# Patient Record
Sex: MALE | Race: WHITE | Employment: OTHER | URBAN - METROPOLITAN AREA
[De-identification: names, ages, dates, MRNs, and addresses within clinical notes are randomized per-mention and may not be internally consistent; named-entity substitution may affect disease eponyms.]

---

## 2020-10-27 ENCOUNTER — OFFICE VISIT (OUTPATIENT)
Dept: INTERNAL MEDICINE CLINIC | Facility: CLINIC | Age: 40
End: 2020-10-27
Payer: COMMERCIAL

## 2020-10-27 VITALS
DIASTOLIC BLOOD PRESSURE: 73 MMHG | SYSTOLIC BLOOD PRESSURE: 131 MMHG | HEIGHT: 77 IN | BODY MASS INDEX: 34 KG/M2 | WEIGHT: 288 LBS | HEART RATE: 85 BPM | RESPIRATION RATE: 21 BRPM

## 2020-10-27 DIAGNOSIS — F17.210 CIGARETTE SMOKER: ICD-10-CM

## 2020-10-27 DIAGNOSIS — F90.0 ATTENTION DEFICIT HYPERACTIVITY DISORDER (ADHD), PREDOMINANTLY INATTENTIVE TYPE: ICD-10-CM

## 2020-10-27 DIAGNOSIS — E83.118 OTHER HEMOCHROMATOSIS: Primary | ICD-10-CM

## 2020-10-27 PROCEDURE — 99406 BEHAV CHNG SMOKING 3-10 MIN: CPT | Performed by: INTERNAL MEDICINE

## 2020-10-27 PROCEDURE — 3075F SYST BP GE 130 - 139MM HG: CPT | Performed by: INTERNAL MEDICINE

## 2020-10-27 PROCEDURE — 3008F BODY MASS INDEX DOCD: CPT | Performed by: INTERNAL MEDICINE

## 2020-10-27 PROCEDURE — 3078F DIAST BP <80 MM HG: CPT | Performed by: INTERNAL MEDICINE

## 2020-10-27 PROCEDURE — 99204 OFFICE O/P NEW MOD 45 MIN: CPT | Performed by: INTERNAL MEDICINE

## 2020-10-27 RX ORDER — DEXTROAMPHETAMINE SACCHARATE, AMPHETAMINE ASPARTATE, DEXTROAMPHETAMINE SULFATE AND AMPHETAMINE SULFATE 7.5; 7.5; 7.5; 7.5 MG/1; MG/1; MG/1; MG/1
30 TABLET ORAL 2 TIMES DAILY
Qty: 60 TABLET | Refills: 0 | Status: SHIPPED | OUTPATIENT
Start: 2020-10-27 | End: 2020-11-28

## 2020-10-27 RX ORDER — DEXTROAMPHETAMINE SACCHARATE, AMPHETAMINE ASPARTATE, DEXTROAMPHETAMINE SULFATE AND AMPHETAMINE SULFATE 7.5; 7.5; 7.5; 7.5 MG/1; MG/1; MG/1; MG/1
30 TABLET ORAL 2 TIMES DAILY
Qty: 60 TABLET | Refills: 0 | Status: SHIPPED | OUTPATIENT
Start: 2020-10-27 | End: 2020-10-27

## 2020-11-01 NOTE — PROGRESS NOTES
HPI:    Patient ID: Khadar Savage is a 35year old male.   Presents as a new patient  HPI  Patient moved to the [facility], he has been treated for hemochromatosis diagnosed in 2018, had extensive evaluation, was followed by hematologist oncologist Location: Left arm, Patient Position: Sitting, Cuff Size: large)   Pulse 85   Resp 21   Ht 6' 5\" (1.956 m)   Wt 288 lb (130.6 kg)   BMI 34.15 kg/m²    Physical Exam    Constitutional: He is oriented to person, place, and time.  He appears well-developed an Referrals:  OP REFERRAL TO [facility] HEMATOLOGY/ONCOLOGY GROUP     Follow-up in 1 month    ID#[id number]

## 2020-11-06 ENCOUNTER — TELEPHONE (OUTPATIENT)
Dept: INTERNAL MEDICINE CLINIC | Facility: CLINIC | Age: 40
End: 2020-11-06

## 2020-11-06 ENCOUNTER — TELEPHONE (OUTPATIENT)
Dept: HEMATOLOGY/ONCOLOGY | Facility: HOSPITAL | Age: 40
End: 2020-11-06

## 2020-11-06 NOTE — TELEPHONE ENCOUNTER
Patient states three different providers were supposed to be sending medical records . He spoke to medical records deparment and advised him to reach out to the site. Please advise if records were received.

## 2020-11-06 NOTE — TELEPHONE ENCOUNTER
11/6/20 Left the patient a message regarding his records and asked him to to see if he can get these records before his appointment or his appointment would be cancelled.

## 2020-11-06 NOTE — TELEPHONE ENCOUNTER
Kaleb called asking if  has [place]. He says they were given to [address] [place] [address].

## 2020-11-09 ENCOUNTER — APPOINTMENT (OUTPATIENT)
Dept: HEMATOLOGY/ONCOLOGY | Facility: HOSPITAL | Age: 40
End: 2020-11-09
Attending: INTERNAL MEDICINE
Payer: COMMERCIAL

## 2020-11-10 NOTE — TELEPHONE ENCOUNTER
Pt calling back, stated he has appt with Dr Adi Motta on Thursday , was advised they also need medical records sent to Dr Cesar Oakes office, unable to find in media

## 2020-11-12 ENCOUNTER — OFFICE VISIT (OUTPATIENT)
Dept: HEMATOLOGY/ONCOLOGY | Facility: HOSPITAL | Age: 40
End: 2020-11-12
Attending: INTERNAL MEDICINE
Payer: COMMERCIAL

## 2020-11-12 VITALS
WEIGHT: 288 LBS | SYSTOLIC BLOOD PRESSURE: 136 MMHG | TEMPERATURE: 98 F | HEART RATE: 89 BPM | RESPIRATION RATE: 18 BRPM | OXYGEN SATURATION: 96 % | DIASTOLIC BLOOD PRESSURE: 74 MMHG | HEIGHT: 77 IN | BODY MASS INDEX: 34 KG/M2

## 2020-11-12 DIAGNOSIS — D75.1 SECONDARY ERYTHROCYTOSIS: ICD-10-CM

## 2020-11-12 DIAGNOSIS — I26.99 PULMONARY EMBOLISM, OTHER, UNSPECIFIED CHRONICITY, UNSPECIFIED WHETHER ACUTE COR PULMONALE PRESENT (HCC): ICD-10-CM

## 2020-11-12 DIAGNOSIS — E83.118 OTHER HEMOCHROMATOSIS: Primary | ICD-10-CM

## 2020-11-12 PROCEDURE — 99245 OFF/OP CONSLTJ NEW/EST HI 55: CPT | Performed by: INTERNAL MEDICINE

## 2020-11-13 NOTE — CONSULTS
[facility]    PATIENT'S NAME: Vinh PRUITT   CONSULTING PHYSICIAN: Fareed Keenan.  Lilly Layton MD   PATIENT ACCOUNT #: [de-identified] LOCATION: [address] [place] [address] RECORD #: [id number] YOB: 1980   CONSULTATION DATE: 11/12/2020       CANCER LEONEL HISTORY:  Hemochromatosis, pulmonary embolus, ADHD, secondary erythrocytosis as outlined above, hepatic steatosis, borderline splenomegaly. MEDICATIONS:  Adderall, apixaban 5 mg twice daily. ALLERGIES:  Ceclor, rash.     SOCIAL HISTORY:  He uses smoke nonsuppressed at greater than 10. JAK2, V617F was negative and he had a bone marrow biopsy that did not show any evidence of a myeloproliferative disorder.     We recommend continuing phlebotomy as needed to achieve goal ferritin less than 50 for his hemoc

## 2020-11-23 ENCOUNTER — TELEPHONE (OUTPATIENT)
Dept: PULMONOLOGY | Facility: CLINIC | Age: 40
End: 2020-11-23

## 2020-11-23 NOTE — TELEPHONE ENCOUNTER
Message  Received: 1 week ago  Message Contents   Adenike Mast MD  [facility] Clinical Staff             RN, please contact this patient to get him added on my schedule within the next month. Per note from Dr. Hansa Jaime:   For his erythrocytosis, this

## 2020-11-24 NOTE — TELEPHONE ENCOUNTER
Spoke with patient informed him consult appointment with Dr. Edmundo Iverson scheduled for 12/10/20 at 1:30PM.  Verified date, time, location and parking lot. Patient verbalized understanding.

## 2020-11-27 DIAGNOSIS — E83.118 OTHER HEMOCHROMATOSIS: ICD-10-CM

## 2020-11-27 NOTE — TELEPHONE ENCOUNTER
Patient is requesting a refill. amphetamine-dextroamphetamine (ADDERALL) 30 MG Oral Tab 60 tablet 0 10/27/2020 11/26/2020   Sig:   Take 1 tablet (30 mg total) by mouth 2 (two) times daily.      Route: Sasha Push Date:   10/27/2020     Order

## 2020-11-28 RX ORDER — DEXTROAMPHETAMINE SACCHARATE, AMPHETAMINE ASPARTATE, DEXTROAMPHETAMINE SULFATE AND AMPHETAMINE SULFATE 7.5; 7.5; 7.5; 7.5 MG/1; MG/1; MG/1; MG/1
30 TABLET ORAL 2 TIMES DAILY
Qty: 60 TABLET | Refills: 0 | Status: SHIPPED | OUTPATIENT
Start: 2020-11-28 | End: 2021-02-18

## 2020-11-30 ENCOUNTER — LAB ENCOUNTER (OUTPATIENT)
Dept: LAB | Age: 40
End: 2020-11-30
Attending: INTERNAL MEDICINE
Payer: COMMERCIAL

## 2020-11-30 ENCOUNTER — OFFICE VISIT (OUTPATIENT)
Dept: INTERNAL MEDICINE CLINIC | Facility: CLINIC | Age: 40
End: 2020-11-30
Payer: COMMERCIAL

## 2020-11-30 VITALS
TEMPERATURE: 98 F | SYSTOLIC BLOOD PRESSURE: 132 MMHG | WEIGHT: 294 LBS | BODY MASS INDEX: 34.71 KG/M2 | HEART RATE: 98 BPM | HEIGHT: 77 IN | DIASTOLIC BLOOD PRESSURE: 87 MMHG

## 2020-11-30 DIAGNOSIS — R09.82 ALLERGIC RHINITIS WITH POSTNASAL DRIP: Primary | ICD-10-CM

## 2020-11-30 DIAGNOSIS — E66.09 CLASS 1 OBESITY DUE TO EXCESS CALORIES WITH SERIOUS COMORBIDITY AND BODY MASS INDEX (BMI) OF 34.0 TO 34.9 IN ADULT: ICD-10-CM

## 2020-11-30 DIAGNOSIS — E83.118 OTHER HEMOCHROMATOSIS: ICD-10-CM

## 2020-11-30 DIAGNOSIS — J30.9 ALLERGIC RHINITIS WITH POSTNASAL DRIP: Primary | ICD-10-CM

## 2020-11-30 DIAGNOSIS — R09.82 ALLERGIC RHINITIS WITH POSTNASAL DRIP: ICD-10-CM

## 2020-11-30 DIAGNOSIS — F90.0 ATTENTION DEFICIT HYPERACTIVITY DISORDER (ADHD), PREDOMINANTLY INATTENTIVE TYPE: ICD-10-CM

## 2020-11-30 DIAGNOSIS — F17.210 CIGARETTE SMOKER: ICD-10-CM

## 2020-11-30 DIAGNOSIS — J30.9 ALLERGIC RHINITIS WITH POSTNASAL DRIP: ICD-10-CM

## 2020-11-30 PROCEDURE — 90686 IIV4 VACC NO PRSV 0.5 ML IM: CPT | Performed by: INTERNAL MEDICINE

## 2020-11-30 PROCEDURE — 3079F DIAST BP 80-89 MM HG: CPT | Performed by: INTERNAL MEDICINE

## 2020-11-30 PROCEDURE — 82728 ASSAY OF FERRITIN: CPT

## 2020-11-30 PROCEDURE — 3008F BODY MASS INDEX DOCD: CPT | Performed by: INTERNAL MEDICINE

## 2020-11-30 PROCEDURE — 36415 COLL VENOUS BLD VENIPUNCTURE: CPT

## 2020-11-30 PROCEDURE — 3075F SYST BP GE 130 - 139MM HG: CPT | Performed by: INTERNAL MEDICINE

## 2020-11-30 PROCEDURE — 80053 COMPREHEN METABOLIC PANEL: CPT

## 2020-11-30 PROCEDURE — 99214 OFFICE O/P EST MOD 30 MIN: CPT | Performed by: INTERNAL MEDICINE

## 2020-11-30 PROCEDURE — 84443 ASSAY THYROID STIM HORMONE: CPT

## 2020-11-30 PROCEDURE — 85025 COMPLETE CBC W/AUTO DIFF WBC: CPT

## 2020-11-30 PROCEDURE — 90471 IMMUNIZATION ADMIN: CPT | Performed by: INTERNAL MEDICINE

## 2020-11-30 NOTE — PROGRESS NOTES
Tobacco Cessation Documentation (Smoking and Smokeless included): I had an in depth therapy session with Homer Nunez about his tobacco use risks and options using the [employer]PSTF's Five A's approach:    Ask: Will Nayely is using tobacco products. Assess:  We ask

## 2020-12-01 NOTE — PROGRESS NOTES
HPI:    Patient ID: Luis Alberto Reza is a 35year old male.   Presents for follow-up on ADHD, nasal congestion  HPI  Patient reports that he has seen hematologist who suggested that patient should undergo sleep study, he already schedule appointment with sleep reactive to light. Conjunctivae and EOM are normal. No scleral icterus. Neck: Normal range of motion. Neck supple. No JVD present. Cardiovascular: Normal rate, regular rhythm and normal heart sounds. No murmur heard. Edema not present.   Pulmonary/

## 2020-12-02 ENCOUNTER — TELEPHONE (OUTPATIENT)
Dept: INTERNAL MEDICINE CLINIC | Facility: CLINIC | Age: 40
End: 2020-12-02

## 2020-12-05 RX ORDER — DEXTROAMPHETAMINE SACCHARATE, AMPHETAMINE ASPARTATE, DEXTROAMPHETAMINE SULFATE AND AMPHETAMINE SULFATE 7.5; 7.5; 7.5; 7.5 MG/1; MG/1; MG/1; MG/1
30 TABLET ORAL 2 TIMES DAILY
Qty: 60 TABLET | Refills: 0 | Status: SHIPPED | OUTPATIENT
Start: 2021-01-25 | End: 2020-12-29

## 2020-12-05 RX ORDER — DEXTROAMPHETAMINE SACCHARATE, AMPHETAMINE ASPARTATE, DEXTROAMPHETAMINE SULFATE AND AMPHETAMINE SULFATE 7.5; 7.5; 7.5; 7.5 MG/1; MG/1; MG/1; MG/1
30 TABLET ORAL 2 TIMES DAILY
Qty: 60 TABLET | Refills: 0 | Status: SHIPPED | OUTPATIENT
Start: 2020-12-27 | End: 2020-12-29

## 2020-12-05 RX ORDER — DEXTROAMPHETAMINE SACCHARATE, AMPHETAMINE ASPARTATE, DEXTROAMPHETAMINE SULFATE AND AMPHETAMINE SULFATE 7.5; 7.5; 7.5; 7.5 MG/1; MG/1; MG/1; MG/1
30 TABLET ORAL 2 TIMES DAILY
Qty: 60 TABLET | Refills: 0 | Status: SHIPPED | OUTPATIENT
Start: 2021-02-24 | End: 2020-12-29

## 2020-12-05 RX ORDER — DEXTROAMPHETAMINE SACCHARATE, AMPHETAMINE ASPARTATE, DEXTROAMPHETAMINE SULFATE AND AMPHETAMINE SULFATE 7.5; 7.5; 7.5; 7.5 MG/1; MG/1; MG/1; MG/1
30 TABLET ORAL 2 TIMES DAILY
Qty: 60 TABLET | Refills: 0 | Status: SHIPPED | OUTPATIENT
Start: 2021-02-25 | End: 2020-12-05

## 2020-12-10 PROBLEM — G47.33 OSA (OBSTRUCTIVE SLEEP APNEA): Status: ACTIVE | Noted: 2020-12-10

## 2020-12-10 NOTE — PROGRESS NOTES
Dear  Fernie Castro :           As you know, Gilmer Farias is a 70-year-old male who I am now evaluating for possible sleep disordered breathing. HISTORY OF PRESENT ILLNESS: The patient has a history of hemochromatosis and had prior phlebotomy.   He also has a history of reactive to light and accommodation. Neck without adenopathy, thyromegaly, JVD nor bruit. Lungs clear to auscultation and percussion. Cardiac regular rate and rhythm no murmur. Abdomen nontender, without hepatosplenomegaly and no mass appreciable.  Extremit

## 2020-12-14 ENCOUNTER — TELEPHONE (OUTPATIENT)
Dept: INTERNAL MEDICINE CLINIC | Facility: CLINIC | Age: 40
End: 2020-12-14

## 2020-12-21 RX ORDER — TADALAFIL 20 MG/1
20 TABLET ORAL
Qty: 30 TABLET | Refills: 1 | Status: CANCELLED | OUTPATIENT
Start: 2020-12-21

## 2021-01-18 ENCOUNTER — OFFICE VISIT (OUTPATIENT)
Dept: SLEEP CENTER | Age: 41
End: 2021-01-18
Attending: INTERNAL MEDICINE
Payer: COMMERCIAL

## 2021-01-18 DIAGNOSIS — G47.33 OSA (OBSTRUCTIVE SLEEP APNEA): ICD-10-CM

## 2021-01-18 PROCEDURE — 95806 SLEEP STUDY UNATT&RESP EFFT: CPT

## 2021-01-20 NOTE — PROCEDURES
[address] [facility]  Accredited by the [facility] Sleep Medicine (AASM)    PATIENT'S NAME: Yadi Porter   ATTENDING PHYSICIAN: Annette Ellis MD   REFERRING PHYSICIAN: Annette Ellis MD   PATIENT ACCOUNT #: [de-identified] LOCATION: [facility] G47. 33). RECOMMENDATIONS:    1. CPAP titration. 2.   Weight loss. 3.   Avoid alcohol. 4.   Avoid sedating drug. 5.   Patient should not drive if at all sleepy.       Please do not hesitate to contact me if there is any question whatsoever r

## 2021-01-29 ENCOUNTER — TELEMEDICINE (OUTPATIENT)
Dept: INTERNAL MEDICINE CLINIC | Facility: CLINIC | Age: 41
End: 2021-01-29

## 2021-01-29 DIAGNOSIS — L98.9 SKIN LESION OF RIGHT ARM: Primary | ICD-10-CM

## 2021-01-29 DIAGNOSIS — Z12.83 SCREENING FOR SKIN CANCER: ICD-10-CM

## 2021-01-29 PROCEDURE — 99212 OFFICE O/P EST SF 10 MIN: CPT | Performed by: INTERNAL MEDICINE

## 2021-02-01 NOTE — PROGRESS NOTES
Telemedicine Note    Virtual Video Check-In    Kevin Leanne verbally consents to a Virtual Video Check-In service on 01/31/21.  Patient understands and accepts financial responsibility for any deductible, co-insurance and/or co-pays associated with this se amphetamine-dextroamphetamine (ADDERALL) 30 MG Oral Tab Take 1 tablet (30 mg total) by mouth 2 (two) times daily. 60 tablet 0   • Tadalafil 20 MG Oral Tab Take 1 tablet (20 mg total) by mouth daily as needed for Erectile Dysfunction.  30 tablet 1   • amphet

## 2021-02-16 ENCOUNTER — TELEPHONE (OUTPATIENT)
Dept: HEMATOLOGY/ONCOLOGY | Facility: HOSPITAL | Age: 41
End: 2021-02-16

## 2021-02-18 ENCOUNTER — LAB ENCOUNTER (OUTPATIENT)
Dept: LAB | Age: 41
End: 2021-02-18
Attending: INTERNAL MEDICINE
Payer: COMMERCIAL

## 2021-02-18 ENCOUNTER — OFFICE VISIT (OUTPATIENT)
Dept: DERMATOLOGY CLINIC | Facility: CLINIC | Age: 41
End: 2021-02-18
Payer: COMMERCIAL

## 2021-02-18 DIAGNOSIS — D22.9 MULTIPLE NEVI: ICD-10-CM

## 2021-02-18 DIAGNOSIS — I26.99 PULMONARY EMBOLISM, OTHER, UNSPECIFIED CHRONICITY, UNSPECIFIED WHETHER ACUTE COR PULMONALE PRESENT (HCC): ICD-10-CM

## 2021-02-18 DIAGNOSIS — D23.70 BENIGN NEOPLASM OF SKIN OF LOWER LIMB, INCLUDING HIP, UNSPECIFIED LATERALITY: ICD-10-CM

## 2021-02-18 DIAGNOSIS — L82.1 SEBORRHEIC KERATOSES: ICD-10-CM

## 2021-02-18 DIAGNOSIS — D23.5 BENIGN NEOPLASM OF SKIN OF TRUNK, EXCEPT SCROTUM: ICD-10-CM

## 2021-02-18 DIAGNOSIS — D23.60 BENIGN NEOPLASM OF SKIN OF UPPER LIMB, INCLUDING SHOULDER, UNSPECIFIED LATERALITY: ICD-10-CM

## 2021-02-18 DIAGNOSIS — L82.1 VERRUCOUS KERATOSIS: Primary | ICD-10-CM

## 2021-02-18 DIAGNOSIS — D23.4 BENIGN NEOPLASM OF SCALP AND SKIN OF NECK: ICD-10-CM

## 2021-02-18 DIAGNOSIS — E83.118 OTHER HEMOCHROMATOSIS: ICD-10-CM

## 2021-02-18 DIAGNOSIS — D23.30 BENIGN NEOPLASM OF SKIN OF FACE: ICD-10-CM

## 2021-02-18 LAB
BASOPHILS # BLD AUTO: 0.06 X10(3) UL (ref 0–0.2)
BASOPHILS NFR BLD AUTO: 0.7 %
D DIMER PPP FEU-MCNC: <0.27 UG/ML FEU (ref ?–0.5)
DEPRECATED HBV CORE AB SER IA-ACNC: 38.8 NG/ML
DEPRECATED RDW RBC AUTO: 40.6 FL (ref 35.1–46.3)
EOSINOPHIL # BLD AUTO: 0.23 X10(3) UL (ref 0–0.7)
EOSINOPHIL NFR BLD AUTO: 2.5 %
ERYTHROCYTE [DISTWIDTH] IN BLOOD BY AUTOMATED COUNT: 13.7 % (ref 11–15)
HCT VFR BLD AUTO: 50.9 %
HGB BLD-MCNC: 17.2 G/DL
IMM GRANULOCYTES # BLD AUTO: 0.01 X10(3) UL (ref 0–1)
IMM GRANULOCYTES NFR BLD: 0.1 %
LYMPHOCYTES # BLD AUTO: 2.74 X10(3) UL (ref 1–4)
LYMPHOCYTES NFR BLD AUTO: 29.8 %
MCH RBC QN AUTO: 28 PG (ref 26–34)
MCHC RBC AUTO-ENTMCNC: 33.8 G/DL (ref 31–37)
MCV RBC AUTO: 82.8 FL
MONOCYTES # BLD AUTO: 0.81 X10(3) UL (ref 0.1–1)
MONOCYTES NFR BLD AUTO: 8.8 %
NEUTROPHILS # BLD AUTO: 5.33 X10 (3) UL (ref 1.5–7.7)
NEUTROPHILS # BLD AUTO: 5.33 X10(3) UL (ref 1.5–7.7)
NEUTROPHILS NFR BLD AUTO: 58.1 %
PLATELET # BLD AUTO: 280 10(3)UL (ref 150–450)
RBC # BLD AUTO: 6.15 X10(6)UL
WBC # BLD AUTO: 9.2 X10(3) UL (ref 4–11)

## 2021-02-18 PROCEDURE — 82728 ASSAY OF FERRITIN: CPT

## 2021-02-18 PROCEDURE — 99203 OFFICE O/P NEW LOW 30 MIN: CPT | Performed by: DERMATOLOGY

## 2021-02-18 PROCEDURE — 85025 COMPLETE CBC W/AUTO DIFF WBC: CPT

## 2021-02-18 PROCEDURE — 85379 FIBRIN DEGRADATION QUANT: CPT

## 2021-02-18 PROCEDURE — 85060 BLOOD SMEAR INTERPRETATION: CPT

## 2021-02-18 PROCEDURE — 36415 COLL VENOUS BLD VENIPUNCTURE: CPT

## 2021-02-25 ENCOUNTER — TELEPHONE (OUTPATIENT)
Dept: INTERNAL MEDICINE CLINIC | Facility: CLINIC | Age: 41
End: 2021-02-25

## 2021-02-25 ENCOUNTER — PATIENT MESSAGE (OUTPATIENT)
Dept: INTERNAL MEDICINE CLINIC | Facility: CLINIC | Age: 41
End: 2021-02-25

## 2021-02-25 RX ORDER — DEXTROAMPHETAMINE SACCHARATE, AMPHETAMINE ASPARTATE, DEXTROAMPHETAMINE SULFATE AND AMPHETAMINE SULFATE 7.5; 7.5; 7.5; 7.5 MG/1; MG/1; MG/1; MG/1
30 TABLET ORAL 2 TIMES DAILY
Qty: 60 TABLET | Refills: 0 | Status: SHIPPED | OUTPATIENT
Start: 2021-02-25 | End: 2021-03-29

## 2021-02-26 ENCOUNTER — TELEPHONE (OUTPATIENT)
Dept: HEMATOLOGY/ONCOLOGY | Facility: HOSPITAL | Age: 41
End: 2021-02-26

## 2021-02-26 NOTE — TELEPHONE ENCOUNTER
From: Job Heart MD  To: Gadiel Lake  Sent: 2/25/2021 9:13 PM CST  Subject: Medication refill    . I refilled prescription for Pedro, Dr. Declan Torres hematologist in November advised you to follow-up in 3 months which is about this time, it will be 6 pool

## 2021-02-26 NOTE — TELEPHONE ENCOUNTER
From: Colby Nichols  To: Olegario Jackson MD  Sent: 2/25/2021 5:54 PM CST  Subject: Prescription Question    Hi Dr. Christian Francois.  My next adderall prescription refill is coming up but the pharmacy you sent it to [facility]) is out of stock and it is o

## 2021-02-26 NOTE — TELEPHONE ENCOUNTER
Patient is upset because he received a $123.00 facility fee charge that his insurance denied. He paid his co-pay $63 and his insurance paid their portion. He has attempted to reach the billing dept on numerous occasion (6) to no avail. He is asking for my help. He cancelled his scheduled appointment with Dr. Tao Allan due to these charges. I explained that I would look into it and contact him. We do not want him to compromise his care. He will wait for my return call.

## 2021-03-01 NOTE — PROGRESS NOTES
Anish Gordon is a 38year old male. HPI:     CC:  Patient presents with:  Full Skin Exam: New pt requesting first full skin exam. Concerned with lesions on arms, back, and lesion on nose. Denies family and personal hx of skin ca.         Allergies:  Cec disorder)    • Hyperlipidemia    • Pulmonary emboli [facility])      Past Surgical History:   Procedure Laterality Date   • OTHER      TIP/SEPTUM/OSTEOTOMIES   • OTHER SURGICAL HISTORY  2006    R tib fib fx   [place]    • OTHER SURGICAL HISTORY  2002    jarvis Age of Onset   • Other (Other) Father         Overweight  -  /deep venous thrombopghlebitis    • Cancer Mother         Breast cancer  8 yr survivor    • Other (Other) Maternal Grandmother         Parkinsons  slowly progression  -     • Diabetes Maternal G placed in this encounter.       Meds & Refills for this Visit:  Requested Prescriptions      No prescriptions requested or ordered in this encounter         Verrucous keratosis  (primary encounter diagnosis)  Seborrheic keratoses  Multiple nevi  Benign neop plan.  The patient is asked to return as noted in follow-up/ above. This note was generated using Dragon voice recognition software. Please contact me regarding any confusion resulting from errors in recognition.     Encounter Times  Including John Tse

## 2021-03-04 ENCOUNTER — TELEPHONE (OUTPATIENT)
Dept: HEMATOLOGY/ONCOLOGY | Facility: HOSPITAL | Age: 41
End: 2021-03-04

## 2021-03-29 ENCOUNTER — PATIENT MESSAGE (OUTPATIENT)
Dept: INTERNAL MEDICINE CLINIC | Facility: CLINIC | Age: 41
End: 2021-03-29

## 2021-03-29 RX ORDER — DEXTROAMPHETAMINE SACCHARATE, AMPHETAMINE ASPARTATE, DEXTROAMPHETAMINE SULFATE AND AMPHETAMINE SULFATE 7.5; 7.5; 7.5; 7.5 MG/1; MG/1; MG/1; MG/1
30 TABLET ORAL 2 TIMES DAILY
Qty: 60 TABLET | Refills: 0 | Status: SHIPPED | OUTPATIENT
Start: 2021-03-29 | End: 2021-04-26

## 2021-03-30 NOTE — TELEPHONE ENCOUNTER
From: Zo Norman  To: Carline Garcia MD  Sent: 3/29/2021 3:35 PM CDT  Subject: Prescription Question    Dr. Tulio May,  My prescription for Adderall with the [facility] on [place] has .  I am due for the refill now and am about to run o

## 2021-03-30 NOTE — TELEPHONE ENCOUNTER
Routed to Dr Silver Leyva for advise, thanks. Requested Prescriptions     Pending Prescriptions Disp Refills   • amphetamine-dextroamphetamine (ADDERALL) 30 MG Oral Tab 60 tablet 0     Sig: Take 1 tablet (30 mg total) by mouth 2 (two) times daily.        Last

## 2021-04-05 ENCOUNTER — PATIENT MESSAGE (OUTPATIENT)
Dept: PULMONOLOGY | Facility: CLINIC | Age: 41
End: 2021-04-05

## 2021-04-26 ENCOUNTER — OFFICE VISIT (OUTPATIENT)
Dept: INTERNAL MEDICINE CLINIC | Facility: CLINIC | Age: 41
End: 2021-04-26
Payer: COMMERCIAL

## 2021-04-26 VITALS
HEART RATE: 93 BPM | SYSTOLIC BLOOD PRESSURE: 132 MMHG | HEIGHT: 77 IN | BODY MASS INDEX: 33.06 KG/M2 | DIASTOLIC BLOOD PRESSURE: 82 MMHG | RESPIRATION RATE: 18 BRPM | WEIGHT: 280 LBS

## 2021-04-26 DIAGNOSIS — E83.118 OTHER HEMOCHROMATOSIS: Primary | ICD-10-CM

## 2021-04-26 DIAGNOSIS — F90.0 ATTENTION DEFICIT HYPERACTIVITY DISORDER (ADHD), PREDOMINANTLY INATTENTIVE TYPE: ICD-10-CM

## 2021-04-26 PROCEDURE — 3079F DIAST BP 80-89 MM HG: CPT | Performed by: INTERNAL MEDICINE

## 2021-04-26 PROCEDURE — 3008F BODY MASS INDEX DOCD: CPT | Performed by: INTERNAL MEDICINE

## 2021-04-26 PROCEDURE — 99213 OFFICE O/P EST LOW 20 MIN: CPT | Performed by: INTERNAL MEDICINE

## 2021-04-26 PROCEDURE — 3075F SYST BP GE 130 - 139MM HG: CPT | Performed by: INTERNAL MEDICINE

## 2021-04-26 RX ORDER — DEXTROAMPHETAMINE SACCHARATE, AMPHETAMINE ASPARTATE, DEXTROAMPHETAMINE SULFATE AND AMPHETAMINE SULFATE 7.5; 7.5; 7.5; 7.5 MG/1; MG/1; MG/1; MG/1
30 TABLET ORAL 2 TIMES DAILY
Qty: 60 TABLET | Refills: 0 | Status: SHIPPED | OUTPATIENT
Start: 2021-05-28 | End: 2021-06-27

## 2021-04-26 RX ORDER — DEXTROAMPHETAMINE SACCHARATE, AMPHETAMINE ASPARTATE, DEXTROAMPHETAMINE SULFATE AND AMPHETAMINE SULFATE 7.5; 7.5; 7.5; 7.5 MG/1; MG/1; MG/1; MG/1
30 TABLET ORAL 2 TIMES DAILY
Qty: 60 TABLET | Refills: 0 | Status: SHIPPED | OUTPATIENT
Start: 2021-04-29 | End: 2021-05-29

## 2021-04-27 NOTE — PROGRESS NOTES
HPI/Subjective:   Patient ID: Pranay Seen is a 38year old male.   Presents for follow-up on ADHD, anger issue    HPI  Patient reports that lately he is bothered by anger outbursts, that come for no reason, that affects his family and his functioning, h Neurological:      General: No focal deficit present. Mental Status: He is alert and oriented to person, place, and time. Mental status is at baseline.    Psychiatric:         Mood and Affect: Mood normal.         Behavior: Behavior normal.         T

## 2021-04-29 RX ORDER — APIXABAN 5 MG/1
TABLET, FILM COATED ORAL
Qty: 60 TABLET | Refills: 5 | Status: SHIPPED | OUTPATIENT
Start: 2021-04-29 | End: 2022-01-08

## 2021-05-12 ENCOUNTER — TELEPHONE (OUTPATIENT)
Dept: INTERNAL MEDICINE CLINIC | Facility: CLINIC | Age: 41
End: 2021-05-12

## 2021-05-14 NOTE — TELEPHONE ENCOUNTER
Dr uJ Garibay patient's message . FYI.      From: Geraldina Skiff, MD  To: Eulalia Rank  Sent: 5/12/2021  8:45 PM CDT  Subject: Test results    I reviewed your test results all of them look stable normal, please speak to Dr. Flores Nephew office for guidance how you

## 2021-08-25 ENCOUNTER — PATIENT MESSAGE (OUTPATIENT)
Dept: INTERNAL MEDICINE CLINIC | Facility: CLINIC | Age: 41
End: 2021-08-25

## 2021-08-25 RX ORDER — TADALAFIL 20 MG/1
20 TABLET ORAL
Qty: 30 TABLET | Refills: 1 | Status: SHIPPED | OUTPATIENT
Start: 2021-08-25

## 2021-08-25 NOTE — TELEPHONE ENCOUNTER
Dr. Lorin Marvin, medication has been pended for your approval/review. You also prescribed this medication back in 12/2020. From: Martha Wong  To:  Isaiah Jeans, MD  Sent: 8/25/2021  9:43 AM CDT  Subject: Prescription Question    Dr. Lorin Marvin,  Could you ple

## 2021-12-01 ENCOUNTER — PATIENT MESSAGE (OUTPATIENT)
Dept: INTERNAL MEDICINE CLINIC | Facility: CLINIC | Age: 41
End: 2021-12-01

## 2021-12-01 DIAGNOSIS — K76.0 HEPATIC STEATOSIS: Primary | ICD-10-CM

## 2021-12-01 DIAGNOSIS — E83.118 OTHER HEMOCHROMATOSIS: ICD-10-CM

## 2021-12-02 NOTE — TELEPHONE ENCOUNTER
From: Kayli Jaimes  To: Anjel Dunn MD  Sent: 12/1/2021 4:14 PM CST  Subject: CBC and Ferratin Test    Dr. Birtha Claude,  Can you please put in a standing order for CBC and Ferratin blood tests so I can get it checked out next time I am by the clinic?

## 2022-01-08 RX ORDER — APIXABAN 5 MG/1
TABLET, FILM COATED ORAL
Qty: 60 TABLET | Refills: 5 | Status: SHIPPED | OUTPATIENT
Start: 2022-01-08

## 2022-01-11 ENCOUNTER — LAB ENCOUNTER (OUTPATIENT)
Dept: LAB | Age: 42
End: 2022-01-11
Attending: INTERNAL MEDICINE
Payer: COMMERCIAL

## 2022-01-11 ENCOUNTER — OFFICE VISIT (OUTPATIENT)
Dept: INTERNAL MEDICINE CLINIC | Facility: CLINIC | Age: 42
End: 2022-01-11
Payer: COMMERCIAL

## 2022-01-11 VITALS
BODY MASS INDEX: 33.3 KG/M2 | HEART RATE: 69 BPM | HEIGHT: 77 IN | WEIGHT: 282 LBS | SYSTOLIC BLOOD PRESSURE: 117 MMHG | DIASTOLIC BLOOD PRESSURE: 79 MMHG | RESPIRATION RATE: 20 BRPM

## 2022-01-11 DIAGNOSIS — R53.83 OTHER FATIGUE: Primary | ICD-10-CM

## 2022-01-11 DIAGNOSIS — Z12.5 PROSTATE CANCER SCREENING: ICD-10-CM

## 2022-01-11 DIAGNOSIS — R79.89 LOW TESTOSTERONE IN MALE: ICD-10-CM

## 2022-01-11 DIAGNOSIS — E83.118 OTHER HEMOCHROMATOSIS: ICD-10-CM

## 2022-01-11 DIAGNOSIS — M25.512 CHRONIC LEFT SHOULDER PAIN: ICD-10-CM

## 2022-01-11 DIAGNOSIS — G89.29 CHRONIC LEFT SHOULDER PAIN: ICD-10-CM

## 2022-01-11 DIAGNOSIS — R53.83 OTHER FATIGUE: ICD-10-CM

## 2022-01-11 LAB
ALBUMIN SERPL-MCNC: 4.2 G/DL (ref 3.4–5)
ALBUMIN/GLOB SERPL: 1.2 {RATIO} (ref 1–2)
ALP LIVER SERPL-CCNC: 86 U/L
ALT SERPL-CCNC: 49 U/L
ANION GAP SERPL CALC-SCNC: 7 MMOL/L (ref 0–18)
AST SERPL-CCNC: 26 U/L (ref 15–37)
BASOPHILS # BLD AUTO: 0.06 X10(3) UL (ref 0–0.2)
BASOPHILS NFR BLD AUTO: 0.6 %
BILIRUB SERPL-MCNC: 0.4 MG/DL (ref 0.1–2)
BUN BLD-MCNC: 15 MG/DL (ref 7–18)
BUN/CREAT SERPL: 13 (ref 10–20)
CALCIUM BLD-MCNC: 9.6 MG/DL (ref 8.5–10.1)
CHLORIDE SERPL-SCNC: 104 MMOL/L (ref 98–112)
CO2 SERPL-SCNC: 28 MMOL/L (ref 21–32)
COMPLEXED PSA SERPL-MCNC: 2.35 NG/ML (ref ?–4)
CREAT BLD-MCNC: 1.15 MG/DL
DEPRECATED HBV CORE AB SER IA-ACNC: 146.1 NG/ML
DEPRECATED RDW RBC AUTO: 38.5 FL (ref 35.1–46.3)
DHEA-S SERPL-MCNC: 113.9 UG/DL
EOSINOPHIL # BLD AUTO: 0.4 X10(3) UL (ref 0–0.7)
EOSINOPHIL NFR BLD AUTO: 3.9 %
ERYTHROCYTE [DISTWIDTH] IN BLOOD BY AUTOMATED COUNT: 12 % (ref 11–15)
EST. AVERAGE GLUCOSE BLD GHB EST-MCNC: 111 MG/DL (ref 68–126)
FASTING STATUS PATIENT QL REPORTED: NO
GLOBULIN PLAS-MCNC: 3.4 G/DL (ref 2.8–4.4)
GLUCOSE BLD-MCNC: 89 MG/DL (ref 70–99)
HBA1C MFR BLD: 5.5 % (ref ?–5.7)
HCT VFR BLD AUTO: 51.6 %
HGB BLD-MCNC: 17.3 G/DL
IMM GRANULOCYTES # BLD AUTO: 0.02 X10(3) UL (ref 0–1)
IMM GRANULOCYTES NFR BLD: 0.2 %
LYMPHOCYTES # BLD AUTO: 3.84 X10(3) UL (ref 1–4)
LYMPHOCYTES NFR BLD AUTO: 37.5 %
MCH RBC QN AUTO: 29.3 PG (ref 26–34)
MCHC RBC AUTO-ENTMCNC: 33.5 G/DL (ref 31–37)
MCV RBC AUTO: 87.5 FL
MONOCYTES # BLD AUTO: 0.9 X10(3) UL (ref 0.1–1)
MONOCYTES NFR BLD AUTO: 8.8 %
NEUTROPHILS # BLD AUTO: 5.03 X10 (3) UL (ref 1.5–7.7)
NEUTROPHILS # BLD AUTO: 5.03 X10(3) UL (ref 1.5–7.7)
NEUTROPHILS NFR BLD AUTO: 49 %
OSMOLALITY SERPL CALC.SUM OF ELEC: 288 MOSM/KG (ref 275–295)
PLATELET # BLD AUTO: 269 10(3)UL (ref 150–450)
POTASSIUM SERPL-SCNC: 4.6 MMOL/L (ref 3.5–5.1)
PROT SERPL-MCNC: 7.6 G/DL (ref 6.4–8.2)
RBC # BLD AUTO: 5.9 X10(6)UL
SODIUM SERPL-SCNC: 139 MMOL/L (ref 136–145)
TSI SER-ACNC: 1.35 MIU/ML (ref 0.36–3.74)
WBC # BLD AUTO: 10.3 X10(3) UL (ref 4–11)

## 2022-01-11 PROCEDURE — 99213 OFFICE O/P EST LOW 20 MIN: CPT | Performed by: INTERNAL MEDICINE

## 2022-01-11 PROCEDURE — 84443 ASSAY THYROID STIM HORMONE: CPT

## 2022-01-11 PROCEDURE — 80053 COMPREHEN METABOLIC PANEL: CPT

## 2022-01-11 PROCEDURE — 82728 ASSAY OF FERRITIN: CPT

## 2022-01-11 PROCEDURE — 3078F DIAST BP <80 MM HG: CPT | Performed by: INTERNAL MEDICINE

## 2022-01-11 PROCEDURE — 3074F SYST BP LT 130 MM HG: CPT | Performed by: INTERNAL MEDICINE

## 2022-01-11 PROCEDURE — 3008F BODY MASS INDEX DOCD: CPT | Performed by: INTERNAL MEDICINE

## 2022-01-11 PROCEDURE — 36415 COLL VENOUS BLD VENIPUNCTURE: CPT

## 2022-01-11 PROCEDURE — 84403 ASSAY OF TOTAL TESTOSTERONE: CPT

## 2022-01-11 PROCEDURE — 85025 COMPLETE CBC W/AUTO DIFF WBC: CPT

## 2022-01-11 PROCEDURE — 82627 DEHYDROEPIANDROSTERONE: CPT

## 2022-01-11 PROCEDURE — 83036 HEMOGLOBIN GLYCOSYLATED A1C: CPT

## 2022-01-11 PROCEDURE — 84402 ASSAY OF FREE TESTOSTERONE: CPT

## 2022-01-11 RX ORDER — DULOXETIN HYDROCHLORIDE 30 MG/1
30 CAPSULE, DELAYED RELEASE ORAL DAILY
COMMUNITY
Start: 2022-01-04

## 2022-01-16 NOTE — PROGRESS NOTES
Subjective:   Patient ID: Aneudy Maddox is a 42year old male. Presents for evaluation of the left shoulder pain follow-up.   On hemochromatosis    HPI  Reports that overall he has been feeling better, since started seeing psychiatrist and medications we over the coracoacromial joint slight limitation of the range of motion.   Assessment & Plan:   Other fatigue  (primary encounter diagnosis) allergy not clear could be multifactorial will check CBC CMP TSH testosterone level  Chronic left shoulder pain MRI o

## 2022-01-17 ENCOUNTER — PATIENT MESSAGE (OUTPATIENT)
Dept: PULMONOLOGY | Facility: CLINIC | Age: 42
End: 2022-01-17

## 2022-01-17 NOTE — TELEPHONE ENCOUNTER
From: Prakash Button  To: Maday Koo MD  Sent: 1/17/2022 9:56 AM CST  Subject: Sleep Apnea    Hello,  I saw Dr. Joaquín Gonsales about a year ago and he ordered a sleep study which found that I had moderate to severe sleep apnea.  The insurance I had at the ti

## 2022-01-17 NOTE — TELEPHONE ENCOUNTER
Patient's LOV was 12/10/20, home sleep test 1/20/21 and patient was going to consider CPAP titration:     Shen Wiggins MD   1/20/2021  8:43 PM CST         I spoke with the patient regarding his test results and he is going to call back next week with h

## 2022-01-18 LAB
TESTOSTERONE, FREE, S: 10.5 NG/DL
TESTOSTERONE, TOTAL, S: 318 NG/DL

## 2022-01-19 ENCOUNTER — OFFICE VISIT (OUTPATIENT)
Dept: HEMATOLOGY/ONCOLOGY | Facility: HOSPITAL | Age: 42
End: 2022-01-19
Attending: INTERNAL MEDICINE
Payer: COMMERCIAL

## 2022-01-19 VITALS
OXYGEN SATURATION: 98 % | DIASTOLIC BLOOD PRESSURE: 88 MMHG | RESPIRATION RATE: 18 BRPM | BODY MASS INDEX: 33.53 KG/M2 | WEIGHT: 284 LBS | TEMPERATURE: 98 F | SYSTOLIC BLOOD PRESSURE: 135 MMHG | HEART RATE: 80 BPM | HEIGHT: 77 IN

## 2022-01-19 DIAGNOSIS — Z86.711 HISTORY OF PULMONARY EMBOLISM: ICD-10-CM

## 2022-01-19 DIAGNOSIS — E83.118 OTHER HEMOCHROMATOSIS: Primary | ICD-10-CM

## 2022-01-19 DIAGNOSIS — Z51.81 ANTICOAGULATION MANAGEMENT ENCOUNTER: ICD-10-CM

## 2022-01-19 DIAGNOSIS — D75.1 SECONDARY ERYTHROCYTOSIS: ICD-10-CM

## 2022-01-19 DIAGNOSIS — Z79.01 ANTICOAGULATION MANAGEMENT ENCOUNTER: ICD-10-CM

## 2022-01-19 PROCEDURE — 99214 OFFICE O/P EST MOD 30 MIN: CPT | Performed by: INTERNAL MEDICINE

## 2022-01-19 NOTE — PROGRESS NOTES
Cancer Center Progress Note    Patient Name: Prasanna Quarles   YOB: 1980   Medical Record Number: [id number]   Attending Physician: Luz Marina James M.D.      Chief Complaint:  Hereditary hemochromatosis, pulmonary embolus secondary erythrocytosi -     • Other (Other) Paternal Grandmother         Ruptured aorta abdominal       Social History:  Social History    Socioeconomic History      Marital status:     Tobacco Use      Smoking status: Former Smoker      Smokeless tobacco: Current User kg/m²     Physical Examination:  General: Patient is alert and oriented x 3, not in acute distress. Psych:  Mood and affect appropriate  Neck: No JVD. Neck is supple. Lymphatics:  There is no visible peripheral lymphadenopathy   Chest: Symmetric expansion

## 2022-01-20 ENCOUNTER — HOSPITAL ENCOUNTER (OUTPATIENT)
Dept: MRI IMAGING | Age: 42
Discharge: HOME | End: 2022-01-20
Attending: INTERNAL MEDICINE
Payer: COMMERCIAL

## 2022-01-20 DIAGNOSIS — G89.29 CHRONIC LEFT SHOULDER PAIN: ICD-10-CM

## 2022-01-20 DIAGNOSIS — R53.83 OTHER FATIGUE: ICD-10-CM

## 2022-01-20 DIAGNOSIS — M25.512 CHRONIC LEFT SHOULDER PAIN: ICD-10-CM

## 2022-01-20 PROCEDURE — 73221 MRI JOINT UPR EXTREM W/O DYE: CPT | Performed by: INTERNAL MEDICINE

## 2022-02-01 ENCOUNTER — APPOINTMENT (OUTPATIENT)
Dept: HEMATOLOGY/ONCOLOGY | Facility: HOSPITAL | Age: 42
End: 2022-02-01
Attending: INTERNAL MEDICINE
Payer: COMMERCIAL

## 2022-02-11 ENCOUNTER — HOSPITAL ENCOUNTER (OUTPATIENT)
Dept: GENERAL RADIOLOGY | Facility: HOSPITAL | Age: 42
Discharge: HOME | End: 2022-02-11
Attending: ORTHOPAEDIC SURGERY
Payer: COMMERCIAL

## 2022-02-11 ENCOUNTER — OFFICE VISIT (OUTPATIENT)
Dept: ORTHOPEDICS CLINIC | Facility: CLINIC | Age: 42
End: 2022-02-11
Payer: COMMERCIAL

## 2022-02-11 VITALS — WEIGHT: 284 LBS | BODY MASS INDEX: 34 KG/M2

## 2022-02-11 DIAGNOSIS — M25.512 LEFT SHOULDER PAIN, UNSPECIFIED CHRONICITY: ICD-10-CM

## 2022-02-11 DIAGNOSIS — S43.432A TEAR OF LEFT GLENOID LABRUM, INITIAL ENCOUNTER: Primary | ICD-10-CM

## 2022-02-11 PROCEDURE — 73030 X-RAY EXAM OF SHOULDER: CPT | Performed by: ORTHOPAEDIC SURGERY

## 2022-02-11 PROCEDURE — 99244 OFF/OP CNSLTJ NEW/EST MOD 40: CPT | Performed by: ORTHOPAEDIC SURGERY

## 2022-02-15 ENCOUNTER — TELEPHONE (OUTPATIENT)
Dept: PHYSICAL THERAPY | Facility: HOSPITAL | Age: 42
End: 2022-02-15

## 2022-02-16 ENCOUNTER — OFFICE VISIT (OUTPATIENT)
Dept: PHYSICAL THERAPY | Age: 42
End: 2022-02-16
Attending: ORTHOPAEDIC SURGERY
Payer: COMMERCIAL

## 2022-02-16 DIAGNOSIS — S43.432A TEAR OF LEFT GLENOID LABRUM, INITIAL ENCOUNTER: ICD-10-CM

## 2022-02-16 PROCEDURE — 97110 THERAPEUTIC EXERCISES: CPT | Performed by: PHYSICAL THERAPIST

## 2022-02-16 PROCEDURE — 97161 PT EVAL LOW COMPLEX 20 MIN: CPT | Performed by: PHYSICAL THERAPIST

## 2022-03-04 ENCOUNTER — OFFICE VISIT (OUTPATIENT)
Dept: PHYSICAL THERAPY | Age: 42
End: 2022-03-04
Attending: ORTHOPAEDIC SURGERY
Payer: COMMERCIAL

## 2022-03-04 DIAGNOSIS — S43.432A TEAR OF LEFT GLENOID LABRUM, INITIAL ENCOUNTER: ICD-10-CM

## 2022-03-04 PROCEDURE — 97110 THERAPEUTIC EXERCISES: CPT | Performed by: PHYSICAL THERAPIST

## 2022-03-08 ENCOUNTER — OFFICE VISIT (OUTPATIENT)
Dept: PHYSICAL THERAPY | Age: 42
End: 2022-03-08
Attending: ORTHOPAEDIC SURGERY
Payer: COMMERCIAL

## 2022-03-08 DIAGNOSIS — S43.432A TEAR OF LEFT GLENOID LABRUM, INITIAL ENCOUNTER: ICD-10-CM

## 2022-03-08 PROCEDURE — 97110 THERAPEUTIC EXERCISES: CPT | Performed by: PHYSICAL THERAPIST

## 2022-03-11 ENCOUNTER — OFFICE VISIT (OUTPATIENT)
Dept: PHYSICAL THERAPY | Age: 42
End: 2022-03-11
Attending: ORTHOPAEDIC SURGERY
Payer: COMMERCIAL

## 2022-03-11 DIAGNOSIS — S43.432A TEAR OF LEFT GLENOID LABRUM, INITIAL ENCOUNTER: ICD-10-CM

## 2022-03-11 PROCEDURE — 97110 THERAPEUTIC EXERCISES: CPT | Performed by: PHYSICAL THERAPIST

## 2022-03-16 ENCOUNTER — OFFICE VISIT (OUTPATIENT)
Dept: PHYSICAL THERAPY | Age: 42
End: 2022-03-16
Attending: ORTHOPAEDIC SURGERY
Payer: COMMERCIAL

## 2022-03-16 DIAGNOSIS — S43.432A TEAR OF LEFT GLENOID LABRUM, INITIAL ENCOUNTER: ICD-10-CM

## 2022-03-16 PROCEDURE — 97110 THERAPEUTIC EXERCISES: CPT | Performed by: PHYSICAL THERAPIST

## 2022-03-18 ENCOUNTER — OFFICE VISIT (OUTPATIENT)
Dept: PHYSICAL THERAPY | Age: 42
End: 2022-03-18
Attending: ORTHOPAEDIC SURGERY
Payer: COMMERCIAL

## 2022-03-18 DIAGNOSIS — S43.432A TEAR OF LEFT GLENOID LABRUM, INITIAL ENCOUNTER: ICD-10-CM

## 2022-03-18 PROCEDURE — 97110 THERAPEUTIC EXERCISES: CPT | Performed by: PHYSICAL THERAPIST

## 2022-03-21 ENCOUNTER — OFFICE VISIT (OUTPATIENT)
Dept: PHYSICAL THERAPY | Age: 42
End: 2022-03-21
Attending: ORTHOPAEDIC SURGERY
Payer: COMMERCIAL

## 2022-03-21 DIAGNOSIS — S43.432A TEAR OF LEFT GLENOID LABRUM, INITIAL ENCOUNTER: ICD-10-CM

## 2022-03-21 PROCEDURE — 97110 THERAPEUTIC EXERCISES: CPT | Performed by: PHYSICAL THERAPIST

## 2022-03-23 ENCOUNTER — TELEPHONE (OUTPATIENT)
Dept: PHYSICAL THERAPY | Facility: HOSPITAL | Age: 42
End: 2022-03-23

## 2022-03-23 ENCOUNTER — APPOINTMENT (OUTPATIENT)
Dept: PHYSICAL THERAPY | Age: 42
End: 2022-03-23
Attending: ORTHOPAEDIC SURGERY
Payer: COMMERCIAL

## 2022-03-28 ENCOUNTER — TELEPHONE (OUTPATIENT)
Dept: HEMATOLOGY/ONCOLOGY | Facility: HOSPITAL | Age: 42
End: 2022-03-28

## 2022-03-28 NOTE — TELEPHONE ENCOUNTER
Call placed to patient. He states he scheduled and cancelled both phlebotomies that were recommended in January by Dr Mati Chavis. Advised that he has a phlebotomy scheduled here tomorrow and requires labs repeated. He states he cannot make tomorrow's appt. Advised the  will call to r/s phlebtomy and instructed to go for labs (orders in place) for ferritin and cbc at his convenience prior to the phlebotomy to be scheduled. He confirms understanding and treatment cx for him tomorrow, anayeli sent to .

## 2022-03-29 ENCOUNTER — APPOINTMENT (OUTPATIENT)
Dept: HEMATOLOGY/ONCOLOGY | Facility: HOSPITAL | Age: 42
End: 2022-03-29
Attending: INTERNAL MEDICINE
Payer: COMMERCIAL

## 2022-04-14 ENCOUNTER — PATIENT MESSAGE (OUTPATIENT)
Dept: ORTHOPEDICS CLINIC | Facility: CLINIC | Age: 42
End: 2022-04-14

## 2022-04-14 ENCOUNTER — OFFICE VISIT (OUTPATIENT)
Dept: ORTHOPEDICS CLINIC | Facility: CLINIC | Age: 42
End: 2022-04-14
Payer: COMMERCIAL

## 2022-04-14 DIAGNOSIS — S43.432D TEAR OF LEFT GLENOID LABRUM, SUBSEQUENT ENCOUNTER: Primary | ICD-10-CM

## 2022-04-14 PROCEDURE — 99213 OFFICE O/P EST LOW 20 MIN: CPT | Performed by: ORTHOPAEDIC SURGERY

## 2022-04-15 NOTE — TELEPHONE ENCOUNTER
From: Dennie Sages  To: Yarely Juares MD  Sent: 4/14/2022 7:01 PM CDT  Subject: Surgery    Dr. Trinh Zapata decided to just go ahead with surgery. Can you please begin the process for me to be placed on the schedule?     Thanks,  [facility]

## 2022-04-15 NOTE — TELEPHONE ENCOUNTER
Patient was just seen yesterday 4/14/22, no surgical form filled out. Printed LOV notes and surgical form for Dr Mary Beatty to fill out. Once we get surgery form back, we will request surgery dates and contact patient. Sent a [employer] message to patient.

## 2022-04-20 ENCOUNTER — TELEPHONE (OUTPATIENT)
Dept: ORTHOPEDICS CLINIC | Facility: CLINIC | Age: 42
End: 2022-04-20

## 2022-04-20 NOTE — TELEPHONE ENCOUNTER
Type of surgery:  Left shoulder arthroscopy posterior labral repair, cyst decompression  Date: 6/8/22  Location: [facility]  Medical Clearance:      *Medical: No      *Dental: No      *Other:  Prior Authorization Status: Pending  Workers Comp:  Medacta/Manny:  [place]: Yes  POV: 6/15/22

## 2022-04-25 ENCOUNTER — TELEPHONE (OUTPATIENT)
Dept: INTERNAL MEDICINE CLINIC | Facility: CLINIC | Age: 42
End: 2022-04-25

## 2022-04-25 DIAGNOSIS — G47.33 OSA (OBSTRUCTIVE SLEEP APNEA): Primary | ICD-10-CM

## 2022-04-26 ENCOUNTER — OFFICE VISIT (OUTPATIENT)
Dept: PULMONOLOGY | Facility: CLINIC | Age: 42
End: 2022-04-26
Payer: COMMERCIAL

## 2022-04-26 VITALS
HEIGHT: 77 IN | BODY MASS INDEX: 33.77 KG/M2 | OXYGEN SATURATION: 96 % | RESPIRATION RATE: 16 BRPM | DIASTOLIC BLOOD PRESSURE: 92 MMHG | WEIGHT: 286 LBS | SYSTOLIC BLOOD PRESSURE: 143 MMHG | HEART RATE: 76 BPM

## 2022-04-26 DIAGNOSIS — G47.33 OSA (OBSTRUCTIVE SLEEP APNEA): Primary | ICD-10-CM

## 2022-04-26 PROCEDURE — 99213 OFFICE O/P EST LOW 20 MIN: CPT | Performed by: INTERNAL MEDICINE

## 2022-04-26 PROCEDURE — 3080F DIAST BP >= 90 MM HG: CPT | Performed by: INTERNAL MEDICINE

## 2022-04-26 PROCEDURE — 3008F BODY MASS INDEX DOCD: CPT | Performed by: INTERNAL MEDICINE

## 2022-04-26 PROCEDURE — 3077F SYST BP >= 140 MM HG: CPT | Performed by: INTERNAL MEDICINE

## 2022-05-02 RX ORDER — TADALAFIL 20 MG/1
20 TABLET ORAL
Qty: 30 TABLET | Refills: 1 | Status: SHIPPED | OUTPATIENT
Start: 2022-05-02

## 2022-06-06 ENCOUNTER — LAB REQUISITION (OUTPATIENT)
Dept: SURGERY | Age: 42
End: 2022-06-06
Payer: COMMERCIAL

## 2022-06-06 DIAGNOSIS — Z01.818 PREOP EXAMINATION: ICD-10-CM

## 2022-06-07 ENCOUNTER — TELEPHONE (OUTPATIENT)
Dept: ORTHOPEDICS CLINIC | Facility: CLINIC | Age: 42
End: 2022-06-07

## 2022-06-07 LAB — SARS-COV-2 RNA RESP QL NAA+PROBE: DETECTED

## 2022-06-07 NOTE — TELEPHONE ENCOUNTER
Recalexia'd an email from [facility]. Patient tested positive, PAT called him. Put cancellation though [employer], emailed Sam Tyson at [facility], and notified Dr Coty Torres.

## 2022-06-09 NOTE — TELEPHONE ENCOUNTER
Called patient back, informed him that I had requested new surgery dates a few days ago. I am waiting to hear back. As soon as I do, I will let patient know.

## 2022-06-16 ENCOUNTER — TELEPHONE (OUTPATIENT)
Dept: ORTHOPEDICS CLINIC | Facility: CLINIC | Age: 42
End: 2022-06-16

## 2022-06-16 DIAGNOSIS — S43.432D SUPERIOR GLENOID LABRUM LESION OF LEFT SHOULDER, SUBSEQUENT ENCOUNTER: Primary | ICD-10-CM

## 2022-06-16 NOTE — TELEPHONE ENCOUNTER
Type of surgery:  Left shoulder arthroscopy posterior labral repair, cyst decompression  Date: 10/12/22  Location: [facility]  Medical Clearance:      *Medical: No      *Dental: No      *Other:  Prior Authorization Status: Pending  Workers Comp:  Medacta/Manny:  [place]: Yes  POV:  10/19/22

## 2022-07-25 ENCOUNTER — PATIENT MESSAGE (OUTPATIENT)
Dept: ORTHOPEDICS CLINIC | Facility: CLINIC | Age: 42
End: 2022-07-25

## 2022-07-25 NOTE — TELEPHONE ENCOUNTER
From: Destini Rodriguez  To: Shantal Galvan MD  Sent: 7/25/2022 1:12 PM CDT  Subject: Surgery     Could you please remind me when my shoulder surgery is scheduled for? I believe it is early September.     Thanks,  [facility]

## 2022-08-19 ENCOUNTER — ORDER TRANSCRIPTION (OUTPATIENT)
Dept: SLEEP CENTER | Age: 42
End: 2022-08-19

## 2022-08-19 DIAGNOSIS — G47.33 OBSTRUCTIVE SLEEP APNEA (ADULT) (PEDIATRIC): Primary | ICD-10-CM

## 2022-09-15 ENCOUNTER — LAB ENCOUNTER (OUTPATIENT)
Dept: LAB | Age: 42
End: 2022-09-15
Attending: INTERNAL MEDICINE
Payer: COMMERCIAL

## 2022-09-15 DIAGNOSIS — E83.118 OTHER HEMOCHROMATOSIS: ICD-10-CM

## 2022-09-15 LAB
BASOPHILS # BLD AUTO: 0.04 X10(3) UL (ref 0–0.2)
BASOPHILS NFR BLD AUTO: 0.5 %
DEPRECATED HBV CORE AB SER IA-ACNC: 334.6 NG/ML
DEPRECATED RDW RBC AUTO: 37.6 FL (ref 35.1–46.3)
EOSINOPHIL # BLD AUTO: 0.33 X10(3) UL (ref 0–0.7)
EOSINOPHIL NFR BLD AUTO: 3.9 %
ERYTHROCYTE [DISTWIDTH] IN BLOOD BY AUTOMATED COUNT: 11.8 % (ref 11–15)
HCT VFR BLD AUTO: 49.6 %
HGB BLD-MCNC: 16.9 G/DL
IMM GRANULOCYTES # BLD AUTO: 0.01 X10(3) UL (ref 0–1)
IMM GRANULOCYTES NFR BLD: 0.1 %
LYMPHOCYTES # BLD AUTO: 2.98 X10(3) UL (ref 1–4)
LYMPHOCYTES NFR BLD AUTO: 35.5 %
MCH RBC QN AUTO: 29.6 PG (ref 26–34)
MCHC RBC AUTO-ENTMCNC: 34.1 G/DL (ref 31–37)
MCV RBC AUTO: 86.9 FL
MONOCYTES # BLD AUTO: 0.73 X10(3) UL (ref 0.1–1)
MONOCYTES NFR BLD AUTO: 8.7 %
NEUTROPHILS # BLD AUTO: 4.3 X10 (3) UL (ref 1.5–7.7)
NEUTROPHILS # BLD AUTO: 4.3 X10(3) UL (ref 1.5–7.7)
NEUTROPHILS NFR BLD AUTO: 51.3 %
PLATELET # BLD AUTO: 230 10(3)UL (ref 150–450)
RBC # BLD AUTO: 5.71 X10(6)UL
WBC # BLD AUTO: 8.4 X10(3) UL (ref 4–11)

## 2022-09-15 PROCEDURE — 36415 COLL VENOUS BLD VENIPUNCTURE: CPT

## 2022-09-15 PROCEDURE — 85025 COMPLETE CBC W/AUTO DIFF WBC: CPT

## 2022-09-15 PROCEDURE — 82728 ASSAY OF FERRITIN: CPT

## 2022-11-02 ENCOUNTER — LAB ENCOUNTER (OUTPATIENT)
Dept: LAB | Age: 42
End: 2022-11-02
Attending: INTERNAL MEDICINE
Payer: COMMERCIAL

## 2022-11-02 DIAGNOSIS — G47.33 OBSTRUCTIVE SLEEP APNEA (ADULT) (PEDIATRIC): ICD-10-CM

## 2022-11-03 LAB — SARS-COV-2 RNA RESP QL NAA+PROBE: NOT DETECTED

## 2022-11-05 ENCOUNTER — OFFICE VISIT (OUTPATIENT)
Dept: SLEEP CENTER | Age: 42
End: 2022-11-05
Attending: INTERNAL MEDICINE
Payer: COMMERCIAL

## 2022-11-05 DIAGNOSIS — G47.33 OSA (OBSTRUCTIVE SLEEP APNEA): Primary | ICD-10-CM

## 2022-11-05 PROCEDURE — 95811 POLYSOM 6/>YRS CPAP 4/> PARM: CPT

## 2022-11-15 ENCOUNTER — TELEPHONE (OUTPATIENT)
Dept: PULMONOLOGY | Facility: CLINIC | Age: 42
End: 2022-11-15

## 2022-11-15 DIAGNOSIS — G47.33 OSA (OBSTRUCTIVE SLEEP APNEA): Primary | ICD-10-CM

## 2022-11-15 NOTE — TELEPHONE ENCOUNTER
----- Message from Yvonne Jalloh MD sent at 11/9/2022  4:21 PM CST -----  RN, please call the patient to facilitate set up of CPAP 12 CWP with appropriate follow-up.   Salud Cesar

## 2022-11-15 NOTE — TELEPHONE ENCOUNTER
Face sheet, DME order, office visit note, home sleep test, and CPAP titration study faxed to [facility]. Fax confirmation received. Patient informed of this and to follow-up with [facility] in 1-2 weeks if he does not hear from them. Also informed patient to schedule follow-up visit between 30-90 days of receiving machine. Patient verbalized understanding.

## 2022-12-29 ENCOUNTER — TELEPHONE (OUTPATIENT)
Dept: PULMONOLOGY | Facility: CLINIC | Age: 42
End: 2022-12-29

## 2022-12-29 NOTE — TELEPHONE ENCOUNTER
Shashi Mondragon requesting face to face notes with 6 months, also stating that patient is benefiting from cpap. Please fax to [phone],SHERIN.

## 2022-12-30 NOTE — TELEPHONE ENCOUNTER
[facility] to check status of order. Explained this is the first time patient has been setup so, we don't have notes stating he's using and benefiting. Brina will forward message to Saige Book and wait for follow-up recommendations.

## 2023-01-06 ENCOUNTER — TELEPHONE (OUTPATIENT)
Dept: INTERNAL MEDICINE CLINIC | Facility: CLINIC | Age: 43
End: 2023-01-06

## 2023-01-18 ENCOUNTER — APPOINTMENT (OUTPATIENT)
Dept: HEMATOLOGY/ONCOLOGY | Facility: HOSPITAL | Age: 43
End: 2023-01-18
Attending: INTERNAL MEDICINE
Payer: COMMERCIAL

## 2023-01-23 ENCOUNTER — PATIENT MESSAGE (OUTPATIENT)
Dept: PULMONOLOGY | Facility: CLINIC | Age: 43
End: 2023-01-23

## 2023-01-24 NOTE — TELEPHONE ENCOUNTER
Called [facility], state order is still under insurance verification. Will call patient with an update. My chart message sent to patient.

## 2023-01-24 NOTE — TELEPHONE ENCOUNTER
From: Agustín Wong  To: Mena Berkowitz MD  Sent: 1/23/2023 9:47 PM CST  Subject: CPAP Machine    I still have not received my CPAP machine. I feel like it's been at least a couple months. Please advise.     Thanks,  Xenia Kwan  [phone]

## 2023-02-18 ENCOUNTER — HOSPITAL ENCOUNTER (OUTPATIENT)
Age: 43
Discharge: HOME | End: 2023-02-18
Payer: COMMERCIAL

## 2023-02-18 VITALS
DIASTOLIC BLOOD PRESSURE: 87 MMHG | HEART RATE: 87 BPM | OXYGEN SATURATION: 99 % | RESPIRATION RATE: 18 BRPM | SYSTOLIC BLOOD PRESSURE: 154 MMHG | TEMPERATURE: 97 F

## 2023-02-18 DIAGNOSIS — R05.1 ACUTE COUGH: Primary | ICD-10-CM

## 2023-02-18 DIAGNOSIS — J01.00 ACUTE MAXILLARY SINUSITIS, RECURRENCE NOT SPECIFIED: ICD-10-CM

## 2023-02-18 LAB
POCT INFLUENZA A: NEGATIVE
POCT INFLUENZA B: NEGATIVE

## 2023-02-18 PROCEDURE — 87502 INFLUENZA DNA AMP PROBE: CPT | Performed by: PHYSICIAN ASSISTANT

## 2023-02-18 PROCEDURE — 99213 OFFICE O/P EST LOW 20 MIN: CPT

## 2023-02-18 PROCEDURE — 99204 OFFICE O/P NEW MOD 45 MIN: CPT

## 2023-02-18 RX ORDER — DOXYCYCLINE HYCLATE 100 MG/1
100 CAPSULE ORAL 2 TIMES DAILY
Qty: 14 CAPSULE | Refills: 0 | Status: SHIPPED | OUTPATIENT
Start: 2023-02-18 | End: 2023-02-25

## 2023-02-18 NOTE — ED INITIAL ASSESSMENT (HPI)
Per pt having headache sore throat and congestion for a week, reports left ear ache and facial pain.

## 2023-03-10 ENCOUNTER — E-VISIT (OUTPATIENT)
Dept: TELEHEALTH | Age: 43
End: 2023-03-10

## 2023-03-10 ENCOUNTER — OFFICE VISIT (OUTPATIENT)
Dept: FAMILY MEDICINE CLINIC | Facility: CLINIC | Age: 43
End: 2023-03-10
Payer: COMMERCIAL

## 2023-03-10 VITALS
RESPIRATION RATE: 20 BRPM | SYSTOLIC BLOOD PRESSURE: 130 MMHG | WEIGHT: 305 LBS | HEART RATE: 90 BPM | TEMPERATURE: 99 F | BODY MASS INDEX: 36 KG/M2 | DIASTOLIC BLOOD PRESSURE: 91 MMHG | OXYGEN SATURATION: 97 %

## 2023-03-10 DIAGNOSIS — Z20.818 STREPTOCOCCUS EXPOSURE: ICD-10-CM

## 2023-03-10 DIAGNOSIS — J01.90 ACUTE NON-RECURRENT SINUSITIS, UNSPECIFIED LOCATION: Primary | ICD-10-CM

## 2023-03-10 DIAGNOSIS — J02.9 SORE THROAT: Primary | ICD-10-CM

## 2023-03-10 DIAGNOSIS — Z20.818 EXPOSURE TO STREP THROAT: ICD-10-CM

## 2023-03-10 DIAGNOSIS — H10.9 CONJUNCTIVITIS OF RIGHT EYE, UNSPECIFIED CONJUNCTIVITIS TYPE: ICD-10-CM

## 2023-03-10 DIAGNOSIS — H57.89 EYE REDNESS: ICD-10-CM

## 2023-03-10 LAB
CONTROL LINE PRESENT WITH A CLEAR BACKGROUND (YES/NO): YES YES/NO
KIT LOT #: NORMAL NUMERIC
OPERATOR ID: NORMAL
POCT LOT NUMBER: NORMAL
RAPID SARS-COV-2 BY PCR: NOT DETECTED
STREP GRP A CUL-SCR: NEGATIVE

## 2023-03-10 PROCEDURE — U0002 COVID-19 LAB TEST NON-CDC: HCPCS | Performed by: NURSE PRACTITIONER

## 2023-03-10 PROCEDURE — 99213 OFFICE O/P EST LOW 20 MIN: CPT | Performed by: NURSE PRACTITIONER

## 2023-03-10 PROCEDURE — 3075F SYST BP GE 130 - 139MM HG: CPT | Performed by: NURSE PRACTITIONER

## 2023-03-10 PROCEDURE — 87880 STREP A ASSAY W/OPTIC: CPT | Performed by: NURSE PRACTITIONER

## 2023-03-10 PROCEDURE — 87081 CULTURE SCREEN ONLY: CPT | Performed by: NURSE PRACTITIONER

## 2023-03-10 PROCEDURE — 3080F DIAST BP >= 90 MM HG: CPT | Performed by: NURSE PRACTITIONER

## 2023-03-10 RX ORDER — AMOXICILLIN AND CLAVULANATE POTASSIUM 875; 125 MG/1; MG/1
1 TABLET, FILM COATED ORAL 2 TIMES DAILY
Qty: 20 TABLET | Refills: 0 | Status: SHIPPED | OUTPATIENT
Start: 2023-03-10 | End: 2023-03-20

## 2023-03-10 RX ORDER — OFLOXACIN 3 MG/ML
1 SOLUTION/ DROPS OPHTHALMIC 4 TIMES DAILY
Qty: 1 EACH | Refills: 0 | Status: SHIPPED | OUTPATIENT
Start: 2023-03-10 | End: 2023-03-17

## 2023-03-27 DIAGNOSIS — E83.118 OTHER HEMOCHROMATOSIS: Primary | ICD-10-CM

## 2023-03-29 ENCOUNTER — APPOINTMENT (OUTPATIENT)
Dept: HEMATOLOGY/ONCOLOGY | Facility: HOSPITAL | Age: 43
End: 2023-03-29
Attending: INTERNAL MEDICINE
Payer: COMMERCIAL

## 2023-04-12 ENCOUNTER — PATIENT MESSAGE (OUTPATIENT)
Dept: PULMONOLOGY | Facility: CLINIC | Age: 43
End: 2023-04-12

## 2023-04-12 NOTE — TELEPHONE ENCOUNTER
From: Jade Block  To: Mi Ho MD  Sent: 4/12/2023 7:10 AM CDT  Subject: CPAP Machine    Hello. I am just letting you know that I now have my CPAP machine and have started uploading data through the [employer]. I was told by the people I picked up the CPAP machine from that I should let you know that. Also, I am not sure if you guys are supposed to set it up or not, but I think I was told that I would receive a new mask every month (or maybe it was every two months?). I have not received a new mask yet and just want to make sure I am set up for that.     Thanks,  Gail Qumulo

## 2023-07-05 ENCOUNTER — TELEPHONE (OUTPATIENT)
Dept: INTERNAL MEDICINE CLINIC | Facility: CLINIC | Age: 43
End: 2023-07-05

## 2023-07-05 DIAGNOSIS — Z00.00 PHYSICAL EXAM, ANNUAL: Primary | ICD-10-CM

## 2023-07-05 DIAGNOSIS — D45 POLYCYTHEMIA VERA (HCC): ICD-10-CM

## 2023-07-05 DIAGNOSIS — Z13.1 SCREENING FOR DIABETES MELLITUS: ICD-10-CM

## 2023-07-05 NOTE — TELEPHONE ENCOUNTER
Pt has an appointment scheduled with Dr. Pamela Lee on 7-7-23 for follow up on meds. Pt wanted a message sent to the doctor to ask if she can give him orders prior to his appointment to get his blood work done.

## 2023-07-06 NOTE — TELEPHONE ENCOUNTER
Spoke to patient, verified . Informed patient that Dr. Arron Kussmaul placed blood work orders for him to get done today before his appointment tomorrow 2023. Patient verbalized understanding.

## 2023-07-31 ENCOUNTER — OFFICE VISIT (OUTPATIENT)
Dept: INTERNAL MEDICINE CLINIC | Facility: CLINIC | Age: 43
End: 2023-07-31

## 2023-07-31 ENCOUNTER — LAB ENCOUNTER (OUTPATIENT)
Dept: LAB | Age: 43
End: 2023-07-31
Attending: INTERNAL MEDICINE
Payer: COMMERCIAL

## 2023-07-31 VITALS
HEIGHT: 77 IN | SYSTOLIC BLOOD PRESSURE: 115 MMHG | WEIGHT: 312.81 LBS | RESPIRATION RATE: 18 BRPM | DIASTOLIC BLOOD PRESSURE: 78 MMHG | HEART RATE: 86 BPM | BODY MASS INDEX: 36.94 KG/M2

## 2023-07-31 DIAGNOSIS — Z13.1 SCREENING FOR DIABETES MELLITUS: ICD-10-CM

## 2023-07-31 DIAGNOSIS — M76.61 ACHILLES TENDINITIS OF BOTH LOWER EXTREMITIES: Primary | ICD-10-CM

## 2023-07-31 DIAGNOSIS — D45 POLYCYTHEMIA VERA (HCC): ICD-10-CM

## 2023-07-31 DIAGNOSIS — Z00.00 PHYSICAL EXAM, ANNUAL: ICD-10-CM

## 2023-07-31 DIAGNOSIS — E83.118 OTHER HEMOCHROMATOSIS: ICD-10-CM

## 2023-07-31 DIAGNOSIS — E66.09 CLASS 2 OBESITY DUE TO EXCESS CALORIES WITHOUT SERIOUS COMORBIDITY WITH BODY MASS INDEX (BMI) OF 37.0 TO 37.9 IN ADULT: ICD-10-CM

## 2023-07-31 DIAGNOSIS — M76.62 ACHILLES TENDINITIS OF BOTH LOWER EXTREMITIES: Primary | ICD-10-CM

## 2023-07-31 LAB
ALBUMIN SERPL-MCNC: 3.9 G/DL (ref 3.4–5)
ALBUMIN/GLOB SERPL: 1 {RATIO} (ref 1–2)
ALP LIVER SERPL-CCNC: 90 U/L
ALT SERPL-CCNC: 122 U/L
ANION GAP SERPL CALC-SCNC: 7 MMOL/L (ref 0–18)
AST SERPL-CCNC: 91 U/L (ref 15–37)
BASOPHILS # BLD AUTO: 0.05 X10(3) UL (ref 0–0.2)
BASOPHILS NFR BLD AUTO: 0.5 %
BILIRUB SERPL-MCNC: 0.5 MG/DL (ref 0.1–2)
BUN BLD-MCNC: 10 MG/DL (ref 7–18)
BUN/CREAT SERPL: 8.7 (ref 10–20)
CALCIUM BLD-MCNC: 9.5 MG/DL (ref 8.5–10.1)
CHLORIDE SERPL-SCNC: 108 MMOL/L (ref 98–112)
CHOLEST SERPL-MCNC: 203 MG/DL (ref ?–200)
CO2 SERPL-SCNC: 27 MMOL/L (ref 21–32)
CREAT BLD-MCNC: 1.15 MG/DL
DEPRECATED HBV CORE AB SER IA-ACNC: 687 NG/ML
DEPRECATED RDW RBC AUTO: 39.3 FL (ref 35.1–46.3)
EGFRCR SERPLBLD CKD-EPI 2021: 81 ML/MIN/1.73M2 (ref 60–?)
EOSINOPHIL # BLD AUTO: 0.33 X10(3) UL (ref 0–0.7)
EOSINOPHIL NFR BLD AUTO: 3.4 %
ERYTHROCYTE [DISTWIDTH] IN BLOOD BY AUTOMATED COUNT: 12.3 % (ref 11–15)
EST. AVERAGE GLUCOSE BLD GHB EST-MCNC: 117 MG/DL (ref 68–126)
FASTING PATIENT LIPID ANSWER: NO
FASTING STATUS PATIENT QL REPORTED: NO
GLOBULIN PLAS-MCNC: 4 G/DL (ref 2.8–4.4)
GLUCOSE BLD-MCNC: 118 MG/DL (ref 70–99)
HBA1C MFR BLD: 5.7 % (ref ?–5.7)
HCT VFR BLD AUTO: 49 %
HDLC SERPL-MCNC: 29 MG/DL (ref 40–59)
HGB BLD-MCNC: 16.4 G/DL
IMM GRANULOCYTES # BLD AUTO: 0.01 X10(3) UL (ref 0–1)
IMM GRANULOCYTES NFR BLD: 0.1 %
IRON SATN MFR SERPL: 29 %
IRON SERPL-MCNC: 120 UG/DL
LDLC SERPL CALC-MCNC: 129 MG/DL (ref ?–100)
LYMPHOCYTES # BLD AUTO: 3.77 X10(3) UL (ref 1–4)
LYMPHOCYTES NFR BLD AUTO: 39.1 %
MCH RBC QN AUTO: 29.2 PG (ref 26–34)
MCHC RBC AUTO-ENTMCNC: 33.5 G/DL (ref 31–37)
MCV RBC AUTO: 87.2 FL
MONOCYTES # BLD AUTO: 0.7 X10(3) UL (ref 0.1–1)
MONOCYTES NFR BLD AUTO: 7.3 %
NEUTROPHILS # BLD AUTO: 4.78 X10 (3) UL (ref 1.5–7.7)
NEUTROPHILS # BLD AUTO: 4.78 X10(3) UL (ref 1.5–7.7)
NEUTROPHILS NFR BLD AUTO: 49.6 %
NONHDLC SERPL-MCNC: 174 MG/DL (ref ?–130)
OSMOLALITY SERPL CALC.SUM OF ELEC: 294 MOSM/KG (ref 275–295)
PLATELET # BLD AUTO: 260 10(3)UL (ref 150–450)
POTASSIUM SERPL-SCNC: 4.2 MMOL/L (ref 3.5–5.1)
PROT SERPL-MCNC: 7.9 G/DL (ref 6.4–8.2)
RBC # BLD AUTO: 5.62 X10(6)UL
SODIUM SERPL-SCNC: 142 MMOL/L (ref 136–145)
TIBC SERPL-MCNC: 416 UG/DL (ref 240–450)
TRANSFERRIN SERPL-MCNC: 279 MG/DL (ref 200–360)
TRIGL SERPL-MCNC: 252 MG/DL (ref 30–149)
TSI SER-ACNC: 1.61 MIU/ML (ref 0.36–3.74)
VLDLC SERPL CALC-MCNC: 46 MG/DL (ref 0–30)
WBC # BLD AUTO: 9.6 X10(3) UL (ref 4–11)

## 2023-07-31 PROCEDURE — 84466 ASSAY OF TRANSFERRIN: CPT

## 2023-07-31 PROCEDURE — 83540 ASSAY OF IRON: CPT

## 2023-07-31 PROCEDURE — 3074F SYST BP LT 130 MM HG: CPT | Performed by: INTERNAL MEDICINE

## 2023-07-31 PROCEDURE — 82728 ASSAY OF FERRITIN: CPT

## 2023-07-31 PROCEDURE — 80061 LIPID PANEL: CPT

## 2023-07-31 PROCEDURE — 80053 COMPREHEN METABOLIC PANEL: CPT

## 2023-07-31 PROCEDURE — 85025 COMPLETE CBC W/AUTO DIFF WBC: CPT

## 2023-07-31 PROCEDURE — 83036 HEMOGLOBIN GLYCOSYLATED A1C: CPT

## 2023-07-31 PROCEDURE — 3078F DIAST BP <80 MM HG: CPT | Performed by: INTERNAL MEDICINE

## 2023-07-31 PROCEDURE — 36415 COLL VENOUS BLD VENIPUNCTURE: CPT

## 2023-07-31 PROCEDURE — 3008F BODY MASS INDEX DOCD: CPT | Performed by: INTERNAL MEDICINE

## 2023-07-31 PROCEDURE — 84443 ASSAY THYROID STIM HORMONE: CPT

## 2023-07-31 PROCEDURE — 99214 OFFICE O/P EST MOD 30 MIN: CPT | Performed by: INTERNAL MEDICINE

## 2023-07-31 RX ORDER — CYCLOBENZAPRINE HCL 10 MG
10 TABLET ORAL NIGHTLY
Qty: 30 TABLET | Refills: 0 | Status: SHIPPED | OUTPATIENT
Start: 2023-07-31

## 2023-08-06 ENCOUNTER — TELEPHONE (OUTPATIENT)
Dept: INTERNAL MEDICINE CLINIC | Facility: CLINIC | Age: 43
End: 2023-08-06

## 2023-08-10 ENCOUNTER — APPOINTMENT (OUTPATIENT)
Dept: HEMATOLOGY/ONCOLOGY | Facility: HOSPITAL | Age: 43
End: 2023-08-10
Attending: INTERNAL MEDICINE
Payer: COMMERCIAL

## 2023-08-11 ENCOUNTER — OFFICE VISIT (OUTPATIENT)
Dept: HEMATOLOGY/ONCOLOGY | Facility: HOSPITAL | Age: 43
End: 2023-08-11
Attending: INTERNAL MEDICINE
Payer: COMMERCIAL

## 2023-08-11 VITALS
DIASTOLIC BLOOD PRESSURE: 94 MMHG | WEIGHT: 311 LBS | HEART RATE: 103 BPM | TEMPERATURE: 97 F | SYSTOLIC BLOOD PRESSURE: 118 MMHG | OXYGEN SATURATION: 96 % | HEIGHT: 77 IN | BODY MASS INDEX: 36.72 KG/M2 | RESPIRATION RATE: 18 BRPM

## 2023-08-11 DIAGNOSIS — E83.118 OTHER HEMOCHROMATOSIS: Primary | ICD-10-CM

## 2023-08-11 DIAGNOSIS — Z79.01 ANTICOAGULATION MANAGEMENT ENCOUNTER: ICD-10-CM

## 2023-08-11 DIAGNOSIS — D75.1 SECONDARY ERYTHROCYTOSIS: ICD-10-CM

## 2023-08-11 DIAGNOSIS — Z51.81 ANTICOAGULATION MANAGEMENT ENCOUNTER: ICD-10-CM

## 2023-08-11 DIAGNOSIS — Z86.711 HISTORY OF PULMONARY EMBOLISM: ICD-10-CM

## 2023-08-11 PROCEDURE — 99214 OFFICE O/P EST MOD 30 MIN: CPT | Performed by: INTERNAL MEDICINE

## 2023-08-16 ENCOUNTER — TELEPHONE (OUTPATIENT)
Dept: INTERNAL MEDICINE CLINIC | Facility: CLINIC | Age: 43
End: 2023-08-16

## 2023-08-16 NOTE — TELEPHONE ENCOUNTER
semaglutide-weight management 0.25 MG/0.5ML Subcutaneous Solution Auto-injector, Inject 0.5 mL (0.25 mg total) into the skin once a week for 4 doses. (Patient not taking: Reported on 2023), Disp: 2 mL, Rfl: 0    semaglutide-weight management 0.25 MG/0.5ML Subcutaneous Solution Auto-injector, Inject 0.5 mL (0.25 mg total) into the skin once a week for 4 doses.  (Patient not taking: Reported on 2023), Disp: 2 mL, Rfl: 0    Key:[id number]  Last name:Martha  :1980

## 2023-08-16 NOTE — TELEPHONE ENCOUNTER
Patient is not taking         semaglutide-weight management 0.25 MG/0.5ML Subcutaneous Solution Auto-injector           Patient not taking. Reported on 8/11/2023    semaglutide-weight management 0.25 MG/0.5ML Subcutaneous Solution Auto-injector          Patient not taking.  Reported on 8/11/2023

## 2023-08-31 ENCOUNTER — OFFICE VISIT (OUTPATIENT)
Dept: PULMONOLOGY | Facility: CLINIC | Age: 43
End: 2023-08-31

## 2023-08-31 VITALS
SYSTOLIC BLOOD PRESSURE: 138 MMHG | OXYGEN SATURATION: 97 % | DIASTOLIC BLOOD PRESSURE: 89 MMHG | HEIGHT: 77 IN | HEART RATE: 102 BPM | WEIGHT: 313 LBS | BODY MASS INDEX: 36.96 KG/M2

## 2023-08-31 DIAGNOSIS — G47.33 OSA (OBSTRUCTIVE SLEEP APNEA): Primary | ICD-10-CM

## 2023-08-31 PROCEDURE — 99213 OFFICE O/P EST LOW 20 MIN: CPT | Performed by: INTERNAL MEDICINE

## 2023-08-31 PROCEDURE — 3008F BODY MASS INDEX DOCD: CPT | Performed by: INTERNAL MEDICINE

## 2023-08-31 PROCEDURE — 3075F SYST BP GE 130 - 139MM HG: CPT | Performed by: INTERNAL MEDICINE

## 2023-08-31 PROCEDURE — 3079F DIAST BP 80-89 MM HG: CPT | Performed by: INTERNAL MEDICINE

## 2023-09-07 ENCOUNTER — PATIENT MESSAGE (OUTPATIENT)
Dept: INTERNAL MEDICINE CLINIC | Facility: CLINIC | Age: 43
End: 2023-09-07

## 2023-09-08 ENCOUNTER — TELEPHONE (OUTPATIENT)
Dept: INTERNAL MEDICINE CLINIC | Facility: CLINIC | Age: 43
End: 2023-09-08

## 2023-09-08 NOTE — TELEPHONE ENCOUNTER
See also [place] 8/11/23 encounter. From: [place]  To: Charmayne Favia, MD  Sent: 9/7/2023  4:43 PM CDT  Subject: Prescription Pre-authorization     Dr. Justen Cronin,  My pharmacist said that my insurance requires pre-authorization for the [facility]. Is that something that your office can handle working on with [employer]?     Thanks,  [place]

## 2023-09-08 NOTE — TELEPHONE ENCOUNTER
Please review. Protocol failed / Has no protocol. Requested Prescriptions   Pending Prescriptions Disp Refills    semaglutide-weight management 0.25 MG/0.5ML Subcutaneous Solution Auto-injector 2 mL 0     Sig: Inject 0.5 mL (0.25 mg total) into the skin once a week for 4 doses.        There is no refill protocol information for this order           Recent Outpatient Visits              1 week ago WANDER (obstructive sleep apnea)    [facility], [address] [facility], [place], Cee Tobin MD    Office Visit    4 weeks ago Other hemochromatosis    [facility] Hematology Oncology Jalene Closs, MD    Office Visit    1 month ago Achilles tendinitis of both lower extremities    [facility], [address] [facility], [facility], Kameron Taylor MD    Office Visit    6 months ago Acute non-recurrent sinusitis, unspecified location    [address], [address], [place]    Office Visit    6 months ago Sore throat    [address], Virtual Visit [place]    E-Visit

## 2023-09-11 NOTE — TELEPHONE ENCOUNTER
Denied    Details of this decision are provided on the physician outcome notice which has been faxed to the number on file.    Case ID: [id number]      Payer: [address] [place] [address]    [phone]   Electronic appeal: Not supported   View History

## 2023-09-12 ENCOUNTER — TELEPHONE (OUTPATIENT)
Dept: INTERNAL MEDICINE CLINIC | Facility: CLINIC | Age: 43
End: 2023-09-12

## 2023-10-20 ENCOUNTER — OFFICE VISIT (OUTPATIENT)
Dept: INTERNAL MEDICINE CLINIC | Facility: CLINIC | Age: 43
End: 2023-10-20

## 2023-10-20 ENCOUNTER — LAB ENCOUNTER (OUTPATIENT)
Dept: LAB | Age: 43
End: 2023-10-20
Attending: INTERNAL MEDICINE

## 2023-10-20 VITALS
DIASTOLIC BLOOD PRESSURE: 79 MMHG | HEART RATE: 89 BPM | BODY MASS INDEX: 36.7 KG/M2 | RESPIRATION RATE: 18 BRPM | HEIGHT: 77 IN | SYSTOLIC BLOOD PRESSURE: 145 MMHG | WEIGHT: 310.81 LBS

## 2023-10-20 DIAGNOSIS — R74.8 ELEVATED LIVER ENZYMES: ICD-10-CM

## 2023-10-20 DIAGNOSIS — E83.118 OTHER HEMOCHROMATOSIS: ICD-10-CM

## 2023-10-20 DIAGNOSIS — G25.81 RLS (RESTLESS LEGS SYNDROME): ICD-10-CM

## 2023-10-20 DIAGNOSIS — R21 RASH AND NONSPECIFIC SKIN ERUPTION: Primary | ICD-10-CM

## 2023-10-20 DIAGNOSIS — E66.01 CLASS 2 SEVERE OBESITY DUE TO EXCESS CALORIES WITH SERIOUS COMORBIDITY AND BODY MASS INDEX (BMI) OF 36.0 TO 36.9 IN ADULT: ICD-10-CM

## 2023-10-20 LAB
ALBUMIN SERPL-MCNC: 4.6 G/DL (ref 3.4–5)
ALBUMIN/GLOB SERPL: 1.2 {RATIO} (ref 1–2)
ALP LIVER SERPL-CCNC: 77 U/L
ALT SERPL-CCNC: 175 U/L
ANION GAP SERPL CALC-SCNC: 6 MMOL/L (ref 0–18)
AST SERPL-CCNC: 88 U/L (ref 15–37)
BASOPHILS # BLD AUTO: 0.04 X10(3) UL (ref 0–0.2)
BASOPHILS NFR BLD AUTO: 0.5 %
BILIRUB SERPL-MCNC: 0.3 MG/DL (ref 0.1–2)
BUN BLD-MCNC: 13 MG/DL (ref 7–18)
BUN/CREAT SERPL: 11.8 (ref 10–20)
CALCIUM BLD-MCNC: 9.8 MG/DL (ref 8.5–10.1)
CHLORIDE SERPL-SCNC: 110 MMOL/L (ref 98–112)
CO2 SERPL-SCNC: 27 MMOL/L (ref 21–32)
CREAT BLD-MCNC: 1.1 MG/DL
DEPRECATED HBV CORE AB SER IA-ACNC: 328 NG/ML
DEPRECATED RDW RBC AUTO: 40.6 FL (ref 35.1–46.3)
EGFRCR SERPLBLD CKD-EPI 2021: 85 ML/MIN/1.73M2 (ref 60–?)
EOSINOPHIL # BLD AUTO: 0.42 X10(3) UL (ref 0–0.7)
EOSINOPHIL NFR BLD AUTO: 5 %
ERYTHROCYTE [DISTWIDTH] IN BLOOD BY AUTOMATED COUNT: 12.3 % (ref 11–15)
EST. AVERAGE GLUCOSE BLD GHB EST-MCNC: 100 MG/DL (ref 68–126)
FASTING STATUS PATIENT QL REPORTED: NO
GLOBULIN PLAS-MCNC: 3.9 G/DL (ref 2.8–4.4)
GLUCOSE BLD-MCNC: 105 MG/DL (ref 70–99)
HBA1C MFR BLD: 5.1 % (ref ?–5.7)
HCT VFR BLD AUTO: 49.1 %
HGB BLD-MCNC: 16.3 G/DL
IMM GRANULOCYTES # BLD AUTO: 0.01 X10(3) UL (ref 0–1)
IMM GRANULOCYTES NFR BLD: 0.1 %
LYMPHOCYTES # BLD AUTO: 3.67 X10(3) UL (ref 1–4)
LYMPHOCYTES NFR BLD AUTO: 44.1 %
MCH RBC QN AUTO: 29.4 PG (ref 26–34)
MCHC RBC AUTO-ENTMCNC: 33.2 G/DL (ref 31–37)
MCV RBC AUTO: 88.5 FL
MONOCYTES # BLD AUTO: 0.68 X10(3) UL (ref 0.1–1)
MONOCYTES NFR BLD AUTO: 8.2 %
NEUTROPHILS # BLD AUTO: 3.51 X10 (3) UL (ref 1.5–7.7)
NEUTROPHILS # BLD AUTO: 3.51 X10(3) UL (ref 1.5–7.7)
NEUTROPHILS NFR BLD AUTO: 42.1 %
OSMOLALITY SERPL CALC.SUM OF ELEC: 296 MOSM/KG (ref 275–295)
PLATELET # BLD AUTO: 287 10(3)UL (ref 150–450)
POTASSIUM SERPL-SCNC: 4.3 MMOL/L (ref 3.5–5.1)
PROT SERPL-MCNC: 8.5 G/DL (ref 6.4–8.2)
RBC # BLD AUTO: 5.55 X10(6)UL
SODIUM SERPL-SCNC: 143 MMOL/L (ref 136–145)
WBC # BLD AUTO: 8.3 X10(3) UL (ref 4–11)

## 2023-10-20 PROCEDURE — 3077F SYST BP >= 140 MM HG: CPT | Performed by: INTERNAL MEDICINE

## 2023-10-20 PROCEDURE — 3008F BODY MASS INDEX DOCD: CPT | Performed by: INTERNAL MEDICINE

## 2023-10-20 PROCEDURE — 85025 COMPLETE CBC W/AUTO DIFF WBC: CPT

## 2023-10-20 PROCEDURE — 82728 ASSAY OF FERRITIN: CPT

## 2023-10-20 PROCEDURE — 80053 COMPREHEN METABOLIC PANEL: CPT

## 2023-10-20 PROCEDURE — 83036 HEMOGLOBIN GLYCOSYLATED A1C: CPT

## 2023-10-20 PROCEDURE — 99214 OFFICE O/P EST MOD 30 MIN: CPT | Performed by: INTERNAL MEDICINE

## 2023-10-20 PROCEDURE — 36415 COLL VENOUS BLD VENIPUNCTURE: CPT

## 2023-10-20 PROCEDURE — 3078F DIAST BP <80 MM HG: CPT | Performed by: INTERNAL MEDICINE

## 2023-10-20 RX ORDER — ROPINIROLE 0.25 MG/1
0.25 TABLET, FILM COATED ORAL 3 TIMES DAILY
Refills: 0 | Status: CANCELLED | OUTPATIENT
Start: 2023-10-20

## 2023-10-24 RX ORDER — ROPINIROLE 0.25 MG/1
TABLET, FILM COATED ORAL
Qty: 90 TABLET | Refills: 0 | Status: SHIPPED | OUTPATIENT
Start: 2023-10-24

## 2023-10-24 RX ORDER — PEN NEEDLE, DIABETIC 30 GX3/16"
1 NEEDLE, DISPOSABLE MISCELLANEOUS DAILY
Qty: 90 EACH | Refills: 0 | Status: SHIPPED | OUTPATIENT
Start: 2023-10-24 | End: 2024-01-22

## 2023-10-24 RX ORDER — LIRAGLUTIDE 6 MG/ML
INJECTION, SOLUTION SUBCUTANEOUS
Qty: 9 ML | Refills: 0 | Status: SHIPPED | OUTPATIENT
Start: 2023-10-24 | End: 2023-12-21

## 2023-10-25 RX ORDER — ROPINIROLE 0.25 MG/1
TABLET, FILM COATED ORAL
Qty: 197 TABLET | Refills: 0 | OUTPATIENT
Start: 2023-10-25

## 2023-11-09 ENCOUNTER — HOSPITAL ENCOUNTER (OUTPATIENT)
Dept: ULTRASOUND IMAGING | Age: 43
Discharge: HOME | End: 2023-11-09
Attending: INTERNAL MEDICINE
Payer: COMMERCIAL

## 2023-11-09 DIAGNOSIS — R74.8 ELEVATED LIVER ENZYMES: ICD-10-CM

## 2023-11-09 PROCEDURE — 76705 ECHO EXAM OF ABDOMEN: CPT | Performed by: INTERNAL MEDICINE

## 2024-02-08 ENCOUNTER — PATIENT MESSAGE (OUTPATIENT)
Dept: INTERNAL MEDICINE CLINIC | Facility: CLINIC | Age: 44
End: 2024-02-08

## 2024-02-08 DIAGNOSIS — N52.8 OTHER MALE ERECTILE DYSFUNCTION: Primary | ICD-10-CM

## 2024-02-09 NOTE — TELEPHONE ENCOUNTER
Last rx for tadafil 5/2022.    Needs appointment.    Future Appointments   Date Time Provider Department Center   4/16/2024  1:00 PM Christina Short APRN [facility] [address]   8/1/2024 11:00 AM Lior Diaz [facility]

## 2024-02-09 NOTE — TELEPHONE ENCOUNTER
From: Ramon Santos  To: Mary Barry  Sent: 2/8/2024 2:03 PM CST  Subject: Tadalafil Medication    Dr. Barry,  Can you send a prescription for generic viagra over to my [facility] in [place] on [address]? You previously prescribed me tadalafil, but I would like to try viagra this time if you don't mind.    Thanks,  Kaleb

## 2024-02-10 NOTE — TELEPHONE ENCOUNTER
Rx pended to run through protocol            Future Appointments   Date Time Provider Department Center   4/8/2024  1:00 PM Mary Barry MD ECL[facility]IM2 [facility] [place]   4/16/2024  1:00 PM Christina Short APRN [facility]GJOSE MANUEL [facility] [address]   8/1/2024 11:00 AM Lior Diaz MD [facility]OPULM [facility]

## 2024-02-11 RX ORDER — TADALAFIL 20 MG/1
20 TABLET ORAL
Qty: 30 TABLET | Refills: 1 | Status: SHIPPED | OUTPATIENT
Start: 2024-02-11

## 2024-02-11 NOTE — TELEPHONE ENCOUNTER
Last ref 5-2-22 # 30 + 1  pls advise, thanks in advance.       Refill Passed Per Protocol    Requested Prescriptions   Pending Prescriptions Disp Refills    Tadalafil 20 MG Oral Tab 30 tablet 1     Sig: Take 1 tablet (20 mg total) by mouth daily as needed for Erectile Dysfunction.       Genitourinary Medications Passed - 2/10/2024  9:26 AM        Passed - Patient does not have pulmonary hypertension on problem list        Passed - In person appointment or virtual visit in the past 12 mos or appointment in next 3 mos     Recent Outpatient Visits              3 months ago Rash and nonspecific skin eruption    [facility], [address], [place] Mary Barry MD    Office Visit    5 months ago WANDER (obstructive sleep apnea)    [facility] Lior Diaz MD    Office Visit    6 months ago Other hemochromatosis    [facility] Hematology Oncology Dexter Ramirez MD    Office Visit    6 months ago Achilles tendinitis of both lower extremities    [facility], [address], [place] aMry Barry MD    Office Visit    11 months ago Acute non-recurrent sinusitis, unspecified location    [facility], [facility], [facility] Archie Friedman APRN    Office Visit          Future Appointments         Provider Department Appt Notes    In 1 month Mary Barry MD [facility], [address], [place] Physical, prescription medication.    In 2 months Christina Short APRN [address] Weight loss.    In 5 months Lior Diaz MD [facility] Sleep apnea                    Future Appointments         Provider Department Appt Notes    In 1 month Mary Barry MD [facility], [address], [place] Physical, prescription medication.    In 2 months Christina Short APRN [facility]  [facility], [address] [place] [address] Weight loss.    In 5 months Lior Diaz MD [facility] Sleep apnea          Recent Outpatient Visits              3 months ago Rash and nonspecific skin eruption    [facility] [place] Kandel, N[place], MD    Office Visit    5 months ago WANDER (obstructive sleep apnea)    [facility] Lior Diaz MD    Office Visit    6 months ago Other hemochromatosis    [facility] Hematology Oncology Dexter Ramirez MD    Office Visit    6 months ago Achilles tendinitis of both lower extremities    [facility]    [facility]    11 months ago Acute non-recurrent sinusitis, unspecified location    [facility], Walk-In Clinic, [facility] Archie Friedman APRN    Office Visit

## 2024-02-12 ENCOUNTER — TELEPHONE (OUTPATIENT)
Dept: INTERNAL MEDICINE CLINIC | Facility: CLINIC | Age: 44
End: 2024-02-12

## 2024-02-12 DIAGNOSIS — N52.8 OTHER MALE ERECTILE DYSFUNCTION: ICD-10-CM

## 2024-02-16 NOTE — TELEPHONE ENCOUNTER
Please see patient's inthinct message response in regards to medication and prior authorization. Please assist. Thank you.

## 2024-02-16 NOTE — TELEPHONE ENCOUNTER
February 15, 2024  Kaleb Santos   to  Em Triage Support (supporting Mary Barry MD)         2/15/24 10:16 PM  OK, but two things. One, I wanted to know if I could have generic viagra this time. Two, I plan to just pay cash so prior authorization is not necessary. Let me know if you need any other information from me.      Thanks,  Jolene Preston, [facility]   to Kaleb Santos         2/15/24  8:18 PM  Venkat Manuel,      Your prescription for Tadafil 20 mg required a prior authorization. We have initiated it and are currently awaiting a determination from your insurance company.    Last read by Kaleb Santos at 10:14 PM on 2/15/2024.

## 2024-02-18 NOTE — TELEPHONE ENCOUNTER
Prior authorization for tadalafil has been denied. Please refer to denial letter that was faxed to the office.     Denied    Details of this decision are provided on the physician outcome notice which has been faxed to the number on file.   Case ID: [id number]      Payer: [employer] [facility]    [phone]    [phone]   Electronic appeal: Not supported   View History

## 2024-02-19 RX ORDER — TADALAFIL 20 MG/1
20 TABLET ORAL
Qty: 8 TABLET | Refills: 1 | OUTPATIENT
Start: 2024-02-19

## 2024-02-22 ENCOUNTER — LAB ENCOUNTER (OUTPATIENT)
Dept: LAB | Age: 44
End: 2024-02-22
Attending: INTERNAL MEDICINE
Payer: COMMERCIAL

## 2024-02-22 DIAGNOSIS — R74.8 ELEVATED LIVER ENZYMES: ICD-10-CM

## 2024-02-22 LAB
DEPRECATED HBV CORE AB SER IA-ACNC: 246.2 NG/ML
HAV AB SER QL IA: REACTIVE
HAV IGM SER QL: NONREACTIVE
HBV CORE AB SERPL QL IA: NONREACTIVE
HBV SURFACE AB SER QL: NONREACTIVE
HBV SURFACE AB SERPL IA-ACNC: <3.1 MIU/ML
HBV SURFACE AG SERPL QL IA: NONREACTIVE
HCV AB SERPL QL IA: NONREACTIVE
TRANSFERRIN SERPL-MCNC: 256 MG/DL (ref 215–365)

## 2024-02-22 PROCEDURE — 82728 ASSAY OF FERRITIN: CPT

## 2024-02-22 PROCEDURE — 82103 ALPHA-1-ANTITRYPSIN TOTAL: CPT

## 2024-02-22 PROCEDURE — 86708 HEPATITIS A ANTIBODY: CPT

## 2024-02-22 PROCEDURE — 86709 HEPATITIS A IGM ANTIBODY: CPT

## 2024-02-22 PROCEDURE — 86803 HEPATITIS C AB TEST: CPT

## 2024-02-22 PROCEDURE — 84466 ASSAY OF TRANSFERRIN: CPT

## 2024-02-22 PROCEDURE — 36415 COLL VENOUS BLD VENIPUNCTURE: CPT

## 2024-02-22 PROCEDURE — 82390 ASSAY OF CERULOPLASMIN: CPT

## 2024-02-22 PROCEDURE — 83516 IMMUNOASSAY NONANTIBODY: CPT

## 2024-02-22 PROCEDURE — 87340 HEPATITIS B SURFACE AG IA: CPT

## 2024-02-22 PROCEDURE — 86704 HEP B CORE ANTIBODY TOTAL: CPT

## 2024-02-22 PROCEDURE — 84165 PROTEIN E-PHORESIS SERUM: CPT

## 2024-02-22 PROCEDURE — 80503 PATH CLIN CONSLTJ SF 5-20: CPT

## 2024-02-22 PROCEDURE — 86706 HEP B SURFACE ANTIBODY: CPT

## 2024-02-23 LAB
A1AT SERPL-MCNC: 162 MG/DL (ref 90–200)
CERULOPLASMIN SERPL-MCNC: 26.4 MG/DL (ref 20–60)

## 2024-02-24 LAB — ACTIN SMOOTH MUSCLE AB: 6 UNITS

## 2024-02-27 LAB
ALBUMIN SERPL ELPH-MCNC: 4.47 G/DL (ref 3.75–5.21)
ALBUMIN/GLOB SERPL: 1.43 {RATIO} (ref 1–2)
ALPHA1 GLOB SERPL ELPH-MCNC: 0.3 G/DL (ref 0.19–0.46)
ALPHA2 GLOB SERPL ELPH-MCNC: 0.72 G/DL (ref 0.48–1.05)
B-GLOBULIN SERPL ELPH-MCNC: 1.06 G/DL (ref 0.68–1.23)
GAMMA GLOB SERPL ELPH-MCNC: 1.05 G/DL (ref 0.62–1.7)
PROT SERPL-MCNC: 7.6 G/DL (ref 5.7–8.2)

## 2024-04-16 ENCOUNTER — OFFICE VISIT (OUTPATIENT)
Dept: INTERNAL MEDICINE CLINIC | Facility: CLINIC | Age: 44
End: 2024-04-16
Payer: COMMERCIAL

## 2024-04-16 VITALS
DIASTOLIC BLOOD PRESSURE: 80 MMHG | BODY MASS INDEX: 35.18 KG/M2 | WEIGHT: 301 LBS | RESPIRATION RATE: 16 BRPM | SYSTOLIC BLOOD PRESSURE: 122 MMHG | HEART RATE: 110 BPM | HEIGHT: 77.5 IN

## 2024-04-16 DIAGNOSIS — R79.89 ELEVATED LIVER FUNCTION TESTS: ICD-10-CM

## 2024-04-16 DIAGNOSIS — G47.33 OSA (OBSTRUCTIVE SLEEP APNEA): ICD-10-CM

## 2024-04-16 DIAGNOSIS — E66.9 OBESITY (BMI 30-39.9): ICD-10-CM

## 2024-04-16 DIAGNOSIS — Z51.81 THERAPEUTIC DRUG MONITORING: Primary | ICD-10-CM

## 2024-04-16 PROBLEM — F51.04 PSYCHOPHYSIOLOGICAL INSOMNIA: Status: ACTIVE | Noted: 2017-02-02

## 2024-04-16 PROBLEM — E34.9 TESTOSTERONE DEFICIENCY: Status: ACTIVE | Noted: 2017-06-15

## 2024-04-16 PROCEDURE — 99214 OFFICE O/P EST MOD 30 MIN: CPT | Performed by: NURSE PRACTITIONER

## 2024-04-16 PROCEDURE — 3008F BODY MASS INDEX DOCD: CPT | Performed by: NURSE PRACTITIONER

## 2024-04-16 PROCEDURE — 3074F SYST BP LT 130 MM HG: CPT | Performed by: NURSE PRACTITIONER

## 2024-04-16 PROCEDURE — 3079F DIAST BP 80-89 MM HG: CPT | Performed by: NURSE PRACTITIONER

## 2024-04-16 RX ORDER — TIRZEPATIDE 2.5 MG/.5ML
2.5 INJECTION, SOLUTION SUBCUTANEOUS WEEKLY
Qty: 2 ML | Refills: 0 | Status: SHIPPED | OUTPATIENT
Start: 2024-04-16

## 2024-04-16 NOTE — PATIENT INSTRUCTIONS
Welcome to the [facility] Program!!  Thank you for placing your trust in our health care team, I look forward to working with you along this journey to better health!  Next steps:     1.  Schedule a personal nutrition consultation with one of our registered dieticians. Bring along your food journal (3 days minimum). See journal options below.  2.  Fill your prescribed medication and take as discussed and prescribed: zepbound 2.5mg weekly x4 weeks and then increase to 5mg weekly   3. Try out some pre-made meal options: scarlet donnelly MD, metabolic meals, Factor 75, Freshly      Please try to work on the following dietary changes this first month:  Daily protein recommendation to start:   grams  Daily carbohydrate: <190g  Daily calories: 2,300-2,400  1.  Drink water with meals and throughout the day, cut down on soda and/or juice if consumed. Consider flavored water options like Bubbly, Spindrift, Hint and Marli.  2.  Eat breakfast daily and focus on having protein with each meal, examples include: greek yogurt, cottage cheese, hard boiled egg, whole grain toast with peanut butter.   3.  Reduce refined carbohydrates and sugars which includes items such as sweets, as well as rice, pasta, and bread and make sure to choose whole grain options when having them with just 1 serving per meal about the size of your inner palm.  4.  Consume non starchy veggies daily working towards making them a good 50% of your daily food intake. Add them to lunch and dinner consistently.  5.  Optional can start a daily probiotic: VSL#3, (order on line at www.vsl3.com). Take 1 capsule daily with water for 30 days, then reduce to 1 every other day (this will reduce the cost). Capsules can be left out for 2 weeks, but then must be refrigerated.      Please download brent My Fitness Pal, LoseIt! Or Net Diary to monitor daily dietary intake and you will be able to see if you are eating the right amount of  calories or too much or too little which would hinder weight loss. Additionally this will help to see your daily carbohydrate and protein intake. When you set the negin up choose 1-2 lbs/week as a goal.  Keeping a paper food journal is an option as well to remain accountable for your choices- this is the start to mindful eating! A low calorie diet has been consistently shown to support weight loss.     Continue or start exercising to help establish a routine. If not already exercising begin with 1 day and progress as able with long-term goal of 30 minutes 5 days a week at a minimum.     Meditation daily can help manage and control stress. Chronic stress can make weight loss difficult.  Exercising is one way to help with stress, but meditation using the CALM Negin or another comparable alternative can be done in your home or place of work with little time commitment. This Negin can also help work on behavior change and improve sleep. Check out the segment under Calm Masterclass and listen to The 4 Pillars of Health. A great way to begin learning about the foundation of lifestyle with practical tips to use in your every day.     Check out www.yourweightmatters.org blog for continued daily support and education along this weight loss journey!    Patient Resources:     Personal Training/Fitness Classes/Health Coaching     [facility] @ [email or web address] Full fitness center with group fitness and personal training. Discount available as client of [facility].  Health Coaching and Personal Training with Sherice Thompson at our [facility]- individual weekly coaching with option to add personal training and small group fitness classes targeted at weight loss- [phone] and/or email @ [email or web address]  360FIT [place] [email or web address] Pet ProductsBlanchard Valley [facility].com. Group Fitness [phone] and/or email Jolanta srivastava  [email or web address] [address].[facility]  FrancHas[facility] Children's Hospitaled Fitness Centers with multiple locations: Backyard Brains (www.Invesdor), GameAccount Network The What's Trending (www.BEST Logistics Technology), Vine Girls (www.PanAtlanta), The Exercise  (www.exercisecoach.LawyerPaid)     Online Fitness  Fitness  on Utube  Fit in 10 DVD series- www.vlzmm67QRI.com  Sit and Be Fit - Chair exercise series Www.sitandbefit.org  Hip Hop Fit with Isidoro Murcia at www.hiphopfit.net     Apps for on the Go Fitness  Snupps 7 Minute Workout (orange box with white 7) - free on the go HIIT training negin  Peloton Negin @ www.onepeloton.com     Nutrition Trackers and Tools  LoseIT! And My Fitness Pal apps and on line for tracking nutrition  NOOM - virtual health coaching  FitFoundation (healthy meals on the go) in [place] @ www.jeefvlxsqpxmj9kGlobal Power Electronics  Bistro MD @ Runnitbistromd.com and Muyqkb20 (keto and low carb plans recommended) @ www.damytl11.com, Metabolic Meals @ www.MyMetabolicMeals.com - individual prepared meals to go  Gobble, Blue Apron, Home , Every Plate, Sunbasket- on line meal delivery programs for preparation at home  Meal Village in [place] for homemade meals to go @ www.mealvillage.[email or web address]  Diet Doctor @ www.dietdoctor.com - low carb swaps  HCS Control Systems - meal prep and planning negin (www.yummly.com)     Stress Management/Behavior/Mindful Eating  CALM meditation negin (www.calm.com)  Headspace  Am I Hungry? Mindful eating virtual  negin  Www.yourweightmatters.org - Obesity Action Coalition sponsored Blog posts daily  Motivation negin (black box with white \")- daily supportive messages sent to your phone     Books/Video Education/Podcasts  Mindless Eating by Dexter Nichole  Why We Get Sick by Dimas Redding (a book about insulin resistance)  Atomic Habits by Jose Daniel Harden (a book about taking small steps to promote greater behavior change)   Can't Hurt Me by Onur Phoenix (a book exploring the power of discipline in achieving your goals)  The End of  Dieting: How to Live for Life by Dr. Turner Souza M.D. or listen to The YOHO Podcast Episode 63: Understanding \"Nutritarian\" Eating w/Dr. Turner Souza  Your Body in Balance: The New Science of Food, Hormones, and Health by Dr. Aj Maher  The Menopause Diet Plan by Stefanie Arora and Gemini Bain  The Complete Guide to fasting by Dr. Connors  Sugar, Salt & Fat by Trena Taylor, Ph.D, R.D.  Weight Loss Surgery Will Not Treat Food Addiction by Candice Dash Ph.D  The Game Changers- D[employer] Documentary on plant based nutrition  Fed Up - documentary about obesity (Free on Utube)  The Truth About Sugar - documentary on sugar (Free on Xecced, https://youJammin Javau.be/0Z2wrbyWH6b)  The Dr. Hurley T5 Wellness Plan by Dr. Trent Hurley MD  Fitlosophy Fitspiration - journal to better health (found at [employer] in fitness aisle)  What Happened to You?- a look at the impact trauma has on behavior written by Jessica Almaraz and Dr. Elio Freitas  Whole Again by Julien Zuniga - discovering your true self after trauma  Jose Henderson talk on Zwittle, The Call to Courage  Podcasts: The Exam Room by the Physician's Committee, Nutrition Facts by Dr. Pierson    We are here to support you with weight loss, but please remember that you still need your primary care provider for your routine health maintenance.

## 2024-04-16 NOTE — PROGRESS NOTES
HISTORY OF PRESENT ILLNESS  Chief Complaint   Patient presents with    Weight Problem     Recommendation from PCP, tried probiotic ( years ago) and tried Mounjaro ( left over from Aunt) 1st 25 with it for the first 2 weeks   got major side affect on the 3rd shot and open for meds     Ramon PRUITT Danielle is a 43 year old male new to our office today for initiation of medical weight loss program.  Patient presents today with c/o excess weight. Referred by, PCP    Has been struggling weight gain for the past couple of years..   Admits to trying aunts mounjaro script (thinks it was the 5mg dosage) for 1 month- lost about #25 lbs, but got sick the 3rd week with sulfur burps and diarrhea  Ideally would like to try something like that again.. PCP tried to get wegovy, but it wasn't approved  +Sugary foods   +Night time eating   Is currently on adderall with ADHD   Has WANDER, doesn't use machine     Denies chest pain, shortness of breath, dizziness, blurred vision, headache, paresthesia, nausea/vomiting.     Reason/goal for weight loss: To lose 50 pounds in 6 months and To keep the weight off in 1 year   Triggers for weight gain? Stress, Eating out, and nighttime eating   Previous weight loss programs? YES:  none  Previous weight loss medications?  none    Reviewed [facility] patient contract: Readiness for Lifestyle change: 8/10, Interest in Medication: 10/10, Bariatric surgery interest: 0/10    Weight  Starting weight: 301  Max weight: 320  Lowest weight: 260    Wt Readings from Last 6 Encounters:   04/16/24 (!) 301 lb (136.5 kg)   10/20/23 (!) 310 lb 12.8 oz (141 kg)   08/31/23 (!) 313 lb (142 kg)   08/11/23 (!) 311 lb (141.1 kg)   07/31/23 (!) 312 lb 12.8 oz (141.9 kg)   03/10/23 (!) 305 lb (138.3 kg)        Typical diet   Breakfast Lunch Dinner Snacks Fluids   Skip,  Skip  3-4pm- leftovers, fast food, hot dog Pizza  Night time eating   Cake, almond milk  Water: adequate   Soda: + 30 oz  Juice:  Coffee/Tea: +arizona tea 30oz       Portion: medium to large   Eats 3 meals per day: yes   Number of restaurant or fast food meals/week: 5 meals/week    Social hx and lifestyle reviewed:    Work: ;     wife- is nurse (works nightshift)   Marital status: , 1.5 yr old daughter    Support: yes  Tobacco use: none  ETOH use: 0  per/week  Supplements: none  Exercise: 1 day per week- working outside   Stress level: 4/10  Sleep hours and integrity: 6 Hours per night    MEDICAL HISTORY  PMH reviewed:   Cardiac disorders:negative   Depression/anxiety: negative  Glaucoma: negative  Kidney stones: negative  Eating disorder: negative  Migraines/seizures: negative  Joint-related conditions: negative  Liver disease: fatty liver   Thyroid disease: negative  Constipation: negative  Diabetes: negative  Sleep Apnea hx: WANDER- doesn't use machine (not consistent)   Cancer hx: negative  DVT: negative  Family or personal history of Pancreatic issues / Medullary Thyroid Cancer: negative  History of bariatric surgery: negative    FMH reviewed: obesity in parent/s or sibling: father    REVIEW OF SYSTEMS  GENERAL: feels well otherwise, and negative fatigue   SKIN: denies any rashes to skin folds  HEENT: snoring- yes; denies neck thickening  LUNGS: denies shortness of breath with exertion, no apnea  CARDIOVASCULAR: denies chest pain on exertion, denies palpitations or pedal edema  GI: denies abdominal pain, distention, No N/V/D/C  MUSCULOSKELETAL: denies back pain, joint pains   NEURO: denies headaches or dizziness  ENDOCRINE: denies any excess hunger, urination or thirst, denies any purple striae  PSYCH: denies change in behavior or mood, denies feeling sad or depressed    EXAM  /80   Pulse 110   Resp 16   Ht 6' 5.5\" (1.969 m)   Wt (!) 301 lb (136.5 kg)   BMI 35.23 kg/m² , Percent body fat: M >25% Male 32.6%;  visceral fat 21 Muscle Mass: 56.3 lbs%       GENERAL: well developed, well nourished, in no apparent distress  SKIN: warm, pink, dry without  rashes to exposed area   EYES: conjunctiva pink, sclera non icteric, PERRLA  HEENT: atraumatic, normocephalic, O/p: Mallampati score- 2  NECK: supple, non tender, no adenopathy, no thyromegaly  LUNGS: CTA in all fields, breathing non labored  CARDIO: RRR without murmur, normal S1 and S2 without clicks or gallops, no pedal edema  GI: +BS, soft, no masses, HSM or tenderness  EXTREMITIES: grossly intact  NEURO: Oriented times three, full ROM of bilateral UE/LE  PSYCH: pleasant, cooperative, normal mood and affect    Lab Results   Component Value Date     (H) 10/20/2023    BUN 13 10/20/2023    BUNCREA 11.8 10/20/2023    CREATSERUM 1.10 10/20/2023    ANIONGAP 6 10/20/2023    GFRNAA 78 01/11/2022    GFRAA 90 01/11/2022    CA 9.8 10/20/2023    OSMOCALC 296 (H) 10/20/2023    ALKPHO 77 10/20/2023    AST 88 (H) 10/20/2023     (H) 10/20/2023    BILT 0.3 10/20/2023    TP 7.6 02/22/2024    ALB 4.47 02/22/2024    GLOBULIN 3.9 10/20/2023    AGRATIO 1.5 04/07/2014     10/20/2023    K 4.3 10/20/2023     10/20/2023    CO2 27.0 10/20/2023     Lab Results   Component Value Date     10/20/2023    A1C 5.1 10/20/2023     Lab Results   Component Value Date    CHOLEST 203 (H) 07/31/2023    TRIG 252 (H) 07/31/2023    HDL 29 (L) 07/31/2023     (H) 07/31/2023    VLDL 46 (H) 07/31/2023    NONHDLC 174 (H) 07/31/2023     No results found for: \"B12\", \"VITB12\"  No results found for: \"VITD\", \"QVITD\", \"CYLC86GZ\"    Current Outpatient Medications on File Prior to Visit   Medication Sig Dispense Refill    Tadalafil 20 MG Oral Tab Take 1 tablet (20 mg total) by mouth daily as needed for Erectile Dysfunction. 8 tablet 1    apixaban (ELIQUIS) 5 MG Oral Tab Take 1 tablet (5 mg total) by mouth 2 (two) times daily. 60 tablet 5    DULoxetine 30 MG Oral Cap DR Particles Take 1 capsule (30 mg total) by mouth daily.      amphetamine-dextroamphetamine (ADDERALL) 30 MG Oral Tab Take 1 tablet (30 mg total) by mouth 2 (two)  times daily. 60 tablet 0     No current facility-administered medications on file prior to visit.       ASSESSMENT/PLAN    ICD-10-CM    1. Therapeutic drug monitoring  Z51.81 OP REFERRAL TO DIETITIAN EMG WLC (WLC USE ONLY)      2. Obesity (BMI 30-39.9)  E66.9 OP REFERRAL TO DIETITIAN EMG WLC (WLC USE ONLY)      3. WANDER (obstructive sleep apnea)  G47.33 OP REFERRAL TO DIETITIAN EMG WLC (WLC USE ONLY)      4. Elevated liver function tests  R79.89 OP REFERRAL TO DIETITIAN EMG WLC (WLC USE ONLY)        Initial Weight Data and Goal Weight Loss:  Weight Calculations  Initial Weight: 301 lbs  Initial Weight Date: 04/16/24  Today's Weight: 301 lbs  5% Goal: 15.05  10% Goal: 30.1  Total Weight Loss: 0 lbs  Initial consult: 301 lbs on 4/16/2024, Down  Lb:  lbs total     PLAN   Will trial zepbound 2.5mg weekly x4 weeks and then (send in Trunkbow message in 3 weeks) to say either you want to stay at the same dose or increase to 5mg. Denies any personal or family history of pancreatitis, pancreatic cancer, thyroid cancer, MEN2    --advised of side effects and adverse effects of this medication  Contradictions: avoid stimulant medications- is already on adderall with PCP  Body composition test results given   Reviewed labs  Continue with vitamin d OTC   Fatty liver, lifestyle education done   WANDER- encouraged to start using CPAP machine regularly   Pre-made meal options given   Decrease carbs, increase protein, no skipping meals   Needs to incorporate exercise regimen FITTE: ACSM recommendations (150-300 minutes/ week in active weight loss)   Follow up with dietitian and psychologist as recommended.  Discussed the role of sleep and stress in weight management.  Counseled on comprehensive weight loss plan including attention to nutrition, exercise and behavior/stress management for success. See patient instruction below for more details.    Total time spent on chart review, pre-charting, obtaining history, counseling, and educating,  reviewing labs was 55 minutes.       NOTE TO PATIENT: The 21st Century Cures Act makes clinical notes like these available to patients in the interest of transparency. Clinical notes are medical documents used by physicians and care providers to communicate with each other. These documents include medical language and terminology, abbreviations, and treatment information that may sound technical and at times possibly unfamiliar. In addition, at times, the verbiage may appear blunt or direct. These documents are one tool providers use to communicate relevant information and clinical opinions of the care providers in a way that allows common understanding of the clinical context.     Weight Loss Contract reviewed and signed today 4/16/2024    Patient Instructions   Welcome to the [facility]!!  Thank you for placing your trust in our health care team, I look forward to working with you along this journey to better health!  Next steps:     1.  Schedule a personal nutrition consultation with one of our registered dieticians. Bring along your food journal (3 days minimum). See journal options below.  2.  Fill your prescribed medication and take as discussed and prescribed: zepbound 2.5mg weekly x4 weeks and then increase to 5mg weekly   3. Try out some pre-made meal options: scarlet donnelly MD, metabolic meals, Factor 75, Freshly      Please try to work on the following dietary changes this first month:  Daily protein recommendation to start:   grams  Daily carbohydrate: <190g  Daily calories: 2,300-2,400  1.  Drink water with meals and throughout the day, cut down on soda and/or juice if consumed. Consider flavored water options like Bubbly, Spindrift, Hint and Marli.  2.  Eat breakfast daily and focus on having protein with each meal, examples include: greek yogurt, cottage cheese, hard boiled egg, whole grain toast with peanut butter.   3.  Reduce refined carbohydrates and  sugars which includes items such as sweets, as well as rice, pasta, and bread and make sure to choose whole grain options when having them with just 1 serving per meal about the size of your inner palm.  4.  Consume non starchy veggies daily working towards making them a good 50% of your daily food intake. Add them to lunch and dinner consistently.  5.  Optional can start a daily probiotic: VSL#3, (order on line at www.vsl3.com). Take 1 capsule daily with water for 30 days, then reduce to 1 every other day (this will reduce the cost). Capsules can be left out for 2 weeks, but then must be refrigerated.      Please download negin My Fitness Pal, LoseIt! Or Net Diary to monitor daily dietary intake and you will be able to see if you are eating the right amount of calories or too much or too little which would hinder weight loss. Additionally this will help to see your daily carbohydrate and protein intake. When you set the negin up choose 1-2 lbs/week as a goal.  Keeping a paper food journal is an option as well to remain accountable for your choices- this is the start to mindful eating! A low calorie diet has been consistently shown to support weight loss.     Continue or start exercising to help establish a routine. If not already exercising begin with 1 day and progress as able with long-term goal of 30 minutes 5 days a week at a minimum.     Meditation daily can help manage and control stress. Chronic stress can make weight loss difficult.  Exercising is one way to help with stress, but meditation using the CALM Negin or another comparable alternative can be done in your home or place of work with little time commitment. This Negin can also help work on behavior change and improve sleep. Check out the segment under Calm Masterclass and listen to The 4 Pillars of Health. A great way to begin learning about the foundation of lifestyle with practical tips to use in your every day.     Check out www.yourweightmatters.org blog  for continued daily support and education along this weight loss journey!    Patient Resources:     Personal Training/Fitness Classes/Health Coaching     [facility] @ [email or web address] Full fitness center with group fitness and personal training. Discount available as client of [employer] [facility].  Health Coaching and Personal Training with Sherice Thompson at our [place] [facility]- individual weekly coaching with option to add personal training and small group fitness classes targeted at weight loss- [phone] and/or email @ [email or web address]  360FIT [place] [email or web address] Group Fitness [phone] and/or email Jolanta at [email or web address]  [facility] Fitness Centers with multiple locations: Likeeds (www.Tradersmail.com), BreconRidge (www.TVU Networks), HZO (www.AudioCure Pharma), The Exercise  (www.exercisecoachMC10)     Online Fitness  Fitness  on INNJOY Travel  Fit in 10 DVD series- www.aqtys71USS.com  Sit and Be Fit - Chair exercise series Www.sitandbefit.org  Hip Hop Fit with Isidoro Murcia at [email or web address].[email or web address]     Apps for on the Go Fitness  [place] WaVE 7 Minute Workout (orange box with white 7) - free on the go HIIT training negin  Peloton Negin @ www.onepeloton.com     Nutrition Trackers and Tools  LoseIT! And My Fitness Pal apps and on line for tracking nutrition  NOOM - virtual health coaching  FitFoundation (healthy meals on the go) in [place] @ www.[email or web address].Colored Solar  Bistro MD @ www.bistromd.com and Pvpzxd96 (keto and low carb plans recommended) @ www.pipwco92.com, Metabolic Meals @ www.MyMetabolicMeals.com - individual prepared meals to go  Gobble, Blue Apron, Home , Every Plate, Sunbasket- on line meal delivery programs for preparation at home  Meal [place] in [place] for homemade meals to go @ www.[email or web address] Solar  Diet Doctor @  www.dietdoctor.com - low carb swaps  YuCatch Resources - meal prep and planning brent (www.yummly.com)     Stress Management/Behavior/Mindful Eating  CALM meditation brent (www.calm.com)  Headspace  Am I Hungry? Mindful eating virtual  brent  Www.yourweightmatters.org - Obesity Action Coalition sponsored Blog posts daily  Motivation brent (black box with white \")- daily supportive messages sent to your phone     Books/Video Education/Podcasts  Mindless Eating by Dexter Nichole  Why We Get Sick by Dimas Redding (a book about insulin resistance)  Atomic Habits by Jose Daniel Harden (a book about taking small steps to promote greater behavior change)   Can't Hurt Me by Onur Phoenix (a book exploring the power of discipline in achieving your goals)  The End of Dieting: How to Live for Life by Dr. Turner Souza M.D. or listen to The [employer] Podcast Episode 63: Understanding \"Nutritarian\" Eating w/Dr. Turner Souza  Your Body in Balance: The New Science of Food, Hormones, and Health by Dr. Aj Maher  The Menopause Diet Plan by Stefanie Arora and Gemini Bain  The Complete Guide to fasting by Dr. Connors  Sugar, Salt & Fat by Trena Tyalor, Ph.D, R.D.  Weight Loss Surgery Will Not Treat Food Addiction by Candice Dash Ph.D  The Game Changers- NaviExpert Documentary on plant based nutrition  Fed Up - documentary about obesity (Free on EZChip)  The Truth About Sugar - documentary on sugar (Free on EZChip, https://youtu.be/3I5vmkyVA8d)  The Dr. Hurley T5 Wellness Plan by Dr. Trent Hurley MD  Fitlosophy Fitspiration - journal to better health (found at [employer] in fitness aisle)  What Happened to You?- a look at the impact trauma has on behavior written by Jessica Almaraz and Dr. Elio Freitas  Whole Again by Julien Zuniga - discovering your true self after trauma  Jose Henderson talk on Netflix, The Call to Courage  Podcasts: The Exam Room by the Physician's Committee, Nutrition Facts by Dr. Pierson    We are here to support you with weight  loss, but please remember that you still need your primary care provider for your routine health maintenance.      No follow-ups on file.    Patient verbalizes understanding.    Christina Short, APRN

## 2024-04-29 ENCOUNTER — PATIENT MESSAGE (OUTPATIENT)
Dept: INTERNAL MEDICINE CLINIC | Facility: CLINIC | Age: 44
End: 2024-04-29

## 2024-04-29 NOTE — TELEPHONE ENCOUNTER
From: Ramon Santos  To: Christina Short  Sent: 4/29/2024 1:04 PM CDT  Subject: Medication    At my last appointment, Ms. Short had prescribed me medication not knowing if my insurance would cover it. She said if a preauthorization was needed, you guys would handle it. She also said that if my insurance would not cover it, then she knew of a way I could purchase it for around $500 per month. Can you please advise if my insurance covered the medication and, if not, and if there are no other options, advise me how I would go about purchasing the medication on my own?    Thanks,  Kaleb  [phone]

## 2024-05-20 ENCOUNTER — LAB ENCOUNTER (OUTPATIENT)
Dept: LAB | Age: 44
End: 2024-05-20
Attending: INTERNAL MEDICINE

## 2024-05-20 ENCOUNTER — OFFICE VISIT (OUTPATIENT)
Dept: INTERNAL MEDICINE CLINIC | Facility: CLINIC | Age: 44
End: 2024-05-20

## 2024-05-20 VITALS
DIASTOLIC BLOOD PRESSURE: 75 MMHG | HEART RATE: 80 BPM | HEIGHT: 77.5 IN | BODY MASS INDEX: 35.06 KG/M2 | WEIGHT: 300 LBS | SYSTOLIC BLOOD PRESSURE: 108 MMHG

## 2024-05-20 DIAGNOSIS — Z00.00 PHYSICAL EXAM, ROUTINE: ICD-10-CM

## 2024-05-20 DIAGNOSIS — E83.118 OTHER HEMOCHROMATOSIS: ICD-10-CM

## 2024-05-20 DIAGNOSIS — Z13.1 SCREENING FOR DIABETES MELLITUS: ICD-10-CM

## 2024-05-20 DIAGNOSIS — Z00.00 PHYSICAL EXAM, ROUTINE: Primary | ICD-10-CM

## 2024-05-20 DIAGNOSIS — G47.33 OSA (OBSTRUCTIVE SLEEP APNEA): ICD-10-CM

## 2024-05-20 LAB
ALBUMIN SERPL-MCNC: 4.4 G/DL (ref 3.2–4.8)
ALBUMIN/GLOB SERPL: 1.5 {RATIO} (ref 1–2)
ALP LIVER SERPL-CCNC: 87 U/L
ALT SERPL-CCNC: 74 U/L
ANION GAP SERPL CALC-SCNC: 5 MMOL/L (ref 0–18)
AST SERPL-CCNC: 58 U/L (ref ?–34)
BASOPHILS # BLD AUTO: 0.05 X10(3) UL (ref 0–0.2)
BASOPHILS NFR BLD AUTO: 0.5 %
BILIRUB SERPL-MCNC: 0.4 MG/DL (ref 0.3–1.2)
BUN BLD-MCNC: 12 MG/DL (ref 9–23)
BUN/CREAT SERPL: 10.6 (ref 10–20)
CALCIUM BLD-MCNC: 9.7 MG/DL (ref 8.7–10.4)
CHLORIDE SERPL-SCNC: 109 MMOL/L (ref 98–112)
CHOLEST SERPL-MCNC: 208 MG/DL (ref ?–200)
CO2 SERPL-SCNC: 30 MMOL/L (ref 21–32)
CREAT BLD-MCNC: 1.13 MG/DL
DEPRECATED HBV CORE AB SER IA-ACNC: 101 NG/ML
DEPRECATED RDW RBC AUTO: 38.2 FL (ref 35.1–46.3)
EGFRCR SERPLBLD CKD-EPI 2021: 82 ML/MIN/1.73M2 (ref 60–?)
EOSINOPHIL # BLD AUTO: 0.4 X10(3) UL (ref 0–0.7)
EOSINOPHIL NFR BLD AUTO: 4.1 %
ERYTHROCYTE [DISTWIDTH] IN BLOOD BY AUTOMATED COUNT: 12.3 % (ref 11–15)
EST. AVERAGE GLUCOSE BLD GHB EST-MCNC: 114 MG/DL (ref 68–126)
FASTING PATIENT LIPID ANSWER: NO
FASTING STATUS PATIENT QL REPORTED: NO
GLOBULIN PLAS-MCNC: 3 G/DL (ref 2–3.5)
GLUCOSE BLD-MCNC: 84 MG/DL (ref 70–99)
HBA1C MFR BLD: 5.6 % (ref ?–5.7)
HCT VFR BLD AUTO: 47.6 %
HDLC SERPL-MCNC: 33 MG/DL (ref 40–59)
HGB BLD-MCNC: 16.4 G/DL
IMM GRANULOCYTES # BLD AUTO: 0.02 X10(3) UL (ref 0–1)
IMM GRANULOCYTES NFR BLD: 0.2 %
IRON SATN MFR SERPL: 38 %
IRON SERPL-MCNC: 149 UG/DL
LDLC SERPL CALC-MCNC: 123 MG/DL (ref ?–100)
LYMPHOCYTES # BLD AUTO: 3.44 X10(3) UL (ref 1–4)
LYMPHOCYTES NFR BLD AUTO: 35 %
MCH RBC QN AUTO: 29.3 PG (ref 26–34)
MCHC RBC AUTO-ENTMCNC: 34.5 G/DL (ref 31–37)
MCV RBC AUTO: 85.2 FL
MONOCYTES # BLD AUTO: 0.94 X10(3) UL (ref 0.1–1)
MONOCYTES NFR BLD AUTO: 9.6 %
NEUTROPHILS # BLD AUTO: 4.98 X10 (3) UL (ref 1.5–7.7)
NEUTROPHILS # BLD AUTO: 4.98 X10(3) UL (ref 1.5–7.7)
NEUTROPHILS NFR BLD AUTO: 50.6 %
NONHDLC SERPL-MCNC: 175 MG/DL (ref ?–130)
OSMOLALITY SERPL CALC.SUM OF ELEC: 297 MOSM/KG (ref 275–295)
PLATELET # BLD AUTO: 269 10(3)UL (ref 150–450)
POTASSIUM SERPL-SCNC: 4.2 MMOL/L (ref 3.5–5.1)
PROT SERPL-MCNC: 7.4 G/DL (ref 5.7–8.2)
RBC # BLD AUTO: 5.59 X10(6)UL
SODIUM SERPL-SCNC: 144 MMOL/L (ref 136–145)
TIBC SERPL-MCNC: 393 UG/DL (ref 250–425)
TRANSFERRIN SERPL-MCNC: 264 MG/DL (ref 215–365)
TRIGL SERPL-MCNC: 293 MG/DL (ref 30–149)
TSI SER-ACNC: 0.98 MIU/ML (ref 0.55–4.78)
VLDLC SERPL CALC-MCNC: 53 MG/DL (ref 0–30)
WBC # BLD AUTO: 9.8 X10(3) UL (ref 4–11)

## 2024-05-20 PROCEDURE — 3074F SYST BP LT 130 MM HG: CPT | Performed by: INTERNAL MEDICINE

## 2024-05-20 PROCEDURE — 3008F BODY MASS INDEX DOCD: CPT | Performed by: INTERNAL MEDICINE

## 2024-05-20 PROCEDURE — 90715 TDAP VACCINE 7 YRS/> IM: CPT | Performed by: INTERNAL MEDICINE

## 2024-05-20 PROCEDURE — 84466 ASSAY OF TRANSFERRIN: CPT

## 2024-05-20 PROCEDURE — 83036 HEMOGLOBIN GLYCOSYLATED A1C: CPT

## 2024-05-20 PROCEDURE — 82728 ASSAY OF FERRITIN: CPT

## 2024-05-20 PROCEDURE — 3078F DIAST BP <80 MM HG: CPT | Performed by: INTERNAL MEDICINE

## 2024-05-20 PROCEDURE — 80053 COMPREHEN METABOLIC PANEL: CPT

## 2024-05-20 PROCEDURE — 90471 IMMUNIZATION ADMIN: CPT | Performed by: INTERNAL MEDICINE

## 2024-05-20 PROCEDURE — 83540 ASSAY OF IRON: CPT

## 2024-05-20 PROCEDURE — 84443 ASSAY THYROID STIM HORMONE: CPT

## 2024-05-20 PROCEDURE — 85025 COMPLETE CBC W/AUTO DIFF WBC: CPT

## 2024-05-20 PROCEDURE — 99396 PREV VISIT EST AGE 40-64: CPT | Performed by: INTERNAL MEDICINE

## 2024-05-20 PROCEDURE — 80061 LIPID PANEL: CPT

## 2024-05-20 PROCEDURE — 36415 COLL VENOUS BLD VENIPUNCTURE: CPT

## 2024-05-20 RX ORDER — SILDENAFIL 100 MG/1
100 TABLET, FILM COATED ORAL AS NEEDED
Qty: 10 TABLET | Refills: 2 | Status: SHIPPED | OUTPATIENT
Start: 2024-05-20

## 2024-05-26 NOTE — PROGRESS NOTES
Subjective:     Patient ID: Ramon Santos is a 43 year old male.  Presents for physical exam    HPI  Patient reports that overall he has been feeling well, working on the weight loss, tried Ozempic for 1 month it helped him to lose some weight, discontinued medication and was able to keep weight off.  He has been donating blood and wondering to see what his ferritin level is.  Discontinued Eliquis  Looking for medication to help erectile dysfunction.  ADHD symptoms very well-controlled on current treatment  Patient reports that he has difficulty using CPAP equipment.  Review of Systems       Constitutional:  Negative for decreased activity, fever, irritability and lethargy  Cardiovascular:  Negative for chest pain and irregular heartbeat/palpitations  Respiratory:  Negative for cough, dyspnea and wheezing.  Eyes:  Negative for eye discharge and vision loss  Endocrine:  Negative for polydipsia and polyphagia  Integumentary:  Negative for pruritus and rash  Neurological:  Negative for gait disturbance, paresthesias.   Psychiatric:  Negative for inappropriate interaction and psychiatric symptoms  Current Outpatient Medications   Medication Sig Dispense Refill    Sildenafil Citrate (VIAGRA) 100 MG Oral Tab Take 1 tablet (100 mg total) by mouth as needed for Erectile Dysfunction. 10 tablet 2    Tadalafil 20 MG Oral Tab Take 1 tablet (20 mg total) by mouth daily as needed for Erectile Dysfunction. 8 tablet 1    DULoxetine 30 MG Oral Cap DR Particles Take 1 capsule (30 mg total) by mouth daily.      amphetamine-dextroamphetamine (ADDERALL) 30 MG Oral Tab Take 1 tablet (30 mg total) by mouth 2 (two) times daily. 60 tablet 0    Tirzepatide-Weight Management (ZEPBOUND) 2.5 MG/0.5ML Subcutaneous Solution Auto-injector Inject 2.5 mg into the skin once a week. (Patient not taking: Reported on 5/20/2024) 2 mL 0    apixaban (ELIQUIS) 5 MG Oral Tab Take 1 tablet (5 mg total) by mouth 2 (two) times daily. (Patient not taking:  Reported on 5/20/2024) 60 tablet 5     Allergies:  Allergies   Allergen Reactions    Ceclor [Cefaclor] RASH       Past Medical History:    ADD (attention deficit disorder)    Hyperlipidemia    Pulmonary emboli (HCC)      Past Surgical History:   Procedure Laterality Date    Other      TIP/SEPTUM/OSTEOTOMIES    Other surgical history  2006    R tib fib fx   [place]     Other surgical history  2002    shoulder separation  - 4th degree L    Tonsillectomy        Family History   Problem Relation Age of Onset    Other (Other) Father         Overweight  -  /deep venous thrombopghlebitis     Cancer Mother         Breast cancer  10 yr survivor     Other (Other) Maternal Grandmother         Parkinsons  slowly progression  -      Diabetes Maternal Grandfather         severe  -      Other (Other) Paternal Grandmother         Ruptured aorta abdominal      Social History:   Social History     Socioeconomic History    Marital status:    Tobacco Use    Smoking status: Former     Passive exposure: Never    Smokeless tobacco: Former    Tobacco comments:     3/4 tin / day  -     Vaping Use    Vaping status: Never Used   Substance and Sexual Activity    Alcohol use: Yes     Comment: occ    Drug use: No   Other Topics Concern    Reaction to local anesthetic No        /75   Pulse 80   Ht 6' 5.5\" (1.969 m)   Wt 300 lb (136.1 kg)   BMI 35.12 kg/m²    Physical Exam  Constitutional:       Appearance: Normal appearance. He is obese.   HENT:      Right Ear: Tympanic membrane, ear canal and external ear normal.      Left Ear: Tympanic membrane, ear canal and external ear normal.   Eyes:      General: No scleral icterus.     Extraocular Movements: Extraocular movements intact.      Conjunctiva/sclera: Conjunctivae normal.      Pupils: Pupils are equal, round, and reactive to light.   Cardiovascular:      Rate and Rhythm: Normal rate and regular rhythm.      Heart sounds: No murmur heard.  Pulmonary:      Effort: Pulmonary  effort is normal.      Breath sounds: No wheezing or rales.   Chest:      Chest wall: No tenderness.   Abdominal:      General: Bowel sounds are normal.      Palpations: Abdomen is soft.      Tenderness: There is no guarding or rebound.   Musculoskeletal:         General: Normal range of motion.      Cervical back: Normal range of motion and neck supple.      Right lower leg: No edema.      Left lower leg: No edema.   Lymphadenopathy:      Cervical: No cervical adenopathy.   Skin:     General: Skin is warm.      Coloration: Skin is not jaundiced.   Neurological:      General: No focal deficit present.      Mental Status: He is alert and oriented to person, place, and time. Mental status is at baseline.   Psychiatric:         Mood and Affect: Mood normal.         Behavior: Behavior normal.         Thought Content: Thought content normal.         Assessment & Plan:   1. Physical exam, routine patient will continue healthy diet, encourage regular physical activities, will check labs   2. Screening for diabetes mellitus check hemoglobin A1c   3. Other hemochromatosis check ferritin level, advised patient to restart Eliquis see hematology   4. WANDER (obstructive sleep apnea) see pulmonology continue to use CPAP    5.      Erectile dysfunction patient will try Viagra 100 mg daily as needed to report if not successful will need to see urologist follow-up in 1 year or sooner if needed    Orders Placed This Encounter   Procedures    CBC With Differential With Platelet    Comp Metabolic Panel (14)    Lipid Panel    TSH W Reflex To Free T4    Hemoglobin A1C    Ferritin    Iron And Tibc [E]    TETANUS, DIPHTHERIA TOXOIDS AND ACELLULAR PERTUSIS VACCINE (TDAP), >7 YEARS, IM USE       Meds This Visit:  Requested Prescriptions     Signed Prescriptions Disp Refills    Sildenafil Citrate (VIAGRA) 100 MG Oral Tab 10 tablet 2     Sig: Take 1 tablet (100 mg total) by mouth as needed for Erectile Dysfunction.       Imaging &  Referrals:  TETANUS, DIPHTHERIA TOXOIDS AND ACELLULAR PERTUSIS VACCINE (TDAP), >7 YEARS, IM USE  OP REFERRAL TO [facility] HEMATOLOGY/ONCOLOGY GROUP  PULMONARY - INTERNAL

## 2024-09-04 NOTE — TELEPHONE ENCOUNTER
Detail Level: Zone Refill passed per [facility], [facility] protocol. Requested Prescriptions   Pending Prescriptions Disp Refills    Tadalafil 20 MG Oral Tab 30 tablet 1     Sig: Take 1 tablet (20 mg total) by mouth daily as needed for Erectile Dysfunction.         Genitourinary Medications Passed - 5/1/2022 10:32 PM        Passed - Patient does not have pulmonary hypertension on problem list        Passed - Appointment in the past 12 or next 3 months               Recent Outpatient Visits              6 days ago WANDER (obstructive sleep apnea)    [place], [facility], Sarah Hunt MD    Office Visit    2 weeks ago Tear of left glenoid labrum, subsequent encounter    [facility], [address], [place], Harini Lal MD    Office Visit    1 month ago Tear of left glenoid labrum, initial encounter    [facility] in [facility], [address] [place], [place] [address]vard Visit    1 month ago Tear of left glenoid labrum, initial encounter    [facility] in [place], [place]    Office Visit    1 month ago Tear of left glenoid labrum, initial encounter    [facility] in [place], [place]    Office Visit            Future Appointments         Provider Department Appt Notes    In 2 weeks Juan Luis 25 Hematology Oncology THERAPEUTIC PHLEBOTOMY    In 1 month Paul Nelson MD [facility], [address] P[address] Left shoulder arthroscopy    In 8 months Viki Ramirez [address] Hematology Oncology annual f/u caf Detail Level: Detailed

## 2024-09-15 ENCOUNTER — PATIENT MESSAGE (OUTPATIENT)
Dept: INTERNAL MEDICINE CLINIC | Facility: CLINIC | Age: 44
End: 2024-09-15

## 2024-09-16 NOTE — TELEPHONE ENCOUNTER
From: Ramon Santos  To: Nidaharvinder Short  Sent: 9/15/2024 1:34 PM CDT  Subject: Zepbound    Hi, Ms. Short. You had prescribed me Zepbound in April but I never filled it due to shortages. The pharmacies now have it back in stock and I would like to pay cash but I so not see the \"coupon\" you referred to. From the website, it appears that in order to get it for $550, I have to get it from the [facility] pharmacy. Have they changed their policy or am I just not finding it?    Thanks,  Kaleb

## 2025-04-02 ENCOUNTER — HOSPITAL ENCOUNTER (OUTPATIENT)
Age: 45
Discharge: HOME | End: 2025-04-02
Payer: COMMERCIAL

## 2025-04-02 ENCOUNTER — APPOINTMENT (OUTPATIENT)
Dept: CT IMAGING | Age: 45
End: 2025-04-02
Attending: NURSE PRACTITIONER
Payer: COMMERCIAL

## 2025-04-02 VITALS
SYSTOLIC BLOOD PRESSURE: 140 MMHG | OXYGEN SATURATION: 96 % | DIASTOLIC BLOOD PRESSURE: 87 MMHG | TEMPERATURE: 98 F | RESPIRATION RATE: 16 BRPM | HEART RATE: 85 BPM

## 2025-04-02 DIAGNOSIS — R19.7 DIARRHEA, UNSPECIFIED TYPE: Primary | ICD-10-CM

## 2025-04-02 LAB
#MXD IC: 1.2 X10ˆ3/UL (ref 0.1–1)
BUN BLD-MCNC: 15 MG/DL (ref 7–18)
CHLORIDE BLD-SCNC: 103 MMOL/L (ref 98–112)
CO2 BLD-SCNC: 24 MMOL/L (ref 21–32)
CREAT BLD-MCNC: 1.2 MG/DL
EGFRCR SERPLBLD CKD-EPI 2021: 76 ML/MIN/1.73M2 (ref 60–?)
GLUCOSE BLD-MCNC: 89 MG/DL (ref 70–99)
HCT VFR BLD AUTO: 50.1 %
HCT VFR BLD CALC: 54 %
HGB BLD-MCNC: 16.7 G/DL
ISTAT IONIZED CALCIUM FOR CHEM 8: 1.23 MMOL/L (ref 1.12–1.32)
LYMPHOCYTES # BLD AUTO: 2.8 X10ˆ3/UL (ref 1–4)
LYMPHOCYTES NFR BLD AUTO: 25.8 %
MCH RBC QN AUTO: 28.5 PG (ref 26–34)
MCHC RBC AUTO-ENTMCNC: 33.3 G/DL (ref 31–37)
MCV RBC AUTO: 85.5 FL (ref 80–100)
MIXED CELL %: 10.8 %
NEUTROPHILS # BLD AUTO: 6.7 X10ˆ3/UL (ref 1.5–7.7)
NEUTROPHILS NFR BLD AUTO: 63.4 %
PLATELET # BLD AUTO: 252 X10ˆ3/UL (ref 150–450)
POTASSIUM BLD-SCNC: 3.9 MMOL/L (ref 3.6–5.1)
RBC # BLD AUTO: 5.86 X10ˆ6/UL
SODIUM BLD-SCNC: 140 MMOL/L (ref 136–145)
WBC # BLD AUTO: 10.7 X10ˆ3/UL (ref 4–11)

## 2025-04-02 PROCEDURE — 99215 OFFICE O/P EST HI 40 MIN: CPT

## 2025-04-02 PROCEDURE — 85025 COMPLETE CBC W/AUTO DIFF WBC: CPT | Performed by: NURSE PRACTITIONER

## 2025-04-02 PROCEDURE — 36415 COLL VENOUS BLD VENIPUNCTURE: CPT

## 2025-04-02 PROCEDURE — 99214 OFFICE O/P EST MOD 30 MIN: CPT

## 2025-04-02 PROCEDURE — 74177 CT ABD & PELVIS W/CONTRAST: CPT | Performed by: NURSE PRACTITIONER

## 2025-04-02 PROCEDURE — 80047 BASIC METABLC PNL IONIZED CA: CPT

## 2025-04-02 NOTE — ED INITIAL ASSESSMENT (HPI)
Patient reports indigestion, belching, loose stools since 3/21.  States on 3/21 he did take a zepbound injection and his dose was increased from 2.5 mg to 10 mg at that time.  Reports 23 lb weight loss since 3/21.

## 2025-04-02 NOTE — ED PROVIDER NOTES
Patient Seen in: [facility] [place]      History     Chief Complaint   Patient presents with    Diarrhea     Stated Complaint: stomach    Subjective:   HPI    44-year-old male presents with loose stools, increased bloating and abdominal cramping.  Symptoms started 3/21/2025.  He states at that time he also took an increased dose of Zepbound.  This was an increase in his dosing.      Objective:     Past Medical History:    ADD (attention deficit disorder)    Hyperlipidemia    Pulmonary emboli (HCC)              Past Surgical History:   Procedure Laterality Date    Other      TIP/SEPTUM/OSTEOTOMIES    Other surgical history  2006    R tib fib fx   [place]     Other surgical history  2002    shoulder separation  - 4th degree L    Tonsillectomy                  Social History     Socioeconomic History    Marital status:    Tobacco Use    Smoking status: Former     Passive exposure: Never    Smokeless tobacco: Former    Tobacco comments:     3/4 tin / day  -     Vaping Use    Vaping status: Never Used   Substance and Sexual Activity    Alcohol use: Yes     Comment: occ    Drug use: No   Other Topics Concern    Reaction to local anesthetic No              Review of Systems    Positive for stated complaint: stomach  Other systems are as noted in HPI.  Constitutional and vital signs reviewed.      All other systems reviewed and negative except as noted above.    Physical Exam     ED Triage Vitals [04/02/25 1259]   /87   Pulse 85   Resp 16   Temp 97.8 °F (36.6 °C)   Temp src Oral   SpO2 96 %   O2 Device None (Room air)       Current Vitals:   No data recorded      Physical Exam  Vitals reviewed.   Constitutional:       General: He is not in acute distress.  Cardiovascular:      Rate and Rhythm: Normal rate and regular rhythm.   Pulmonary:      Effort: Pulmonary effort is normal.      Breath sounds: Normal breath sounds.   Abdominal:      Palpations: Abdomen is soft.      Tenderness: There is abdominal  tenderness.      Comments: Generalized abd tenderness    Skin:     General: Skin is warm and dry.   Neurological:      General: No focal deficit present.      Mental Status: He is alert and oriented to person, place, and time.   Psychiatric:         Mood and Affect: Mood normal.         Behavior: Behavior normal.             ED Course     Labs Reviewed   POCT CBC - Abnormal; Notable for the following components:       Result Value    RBC IC 5.86 (*)     # Mixed Cells 1.2 (*)     All other components within normal limits   POCT ISTAT CHEM8 CARTRIDGE - Abnormal; Notable for the following components:    ISTAT Hematocrit 54 (*)     All other components within normal limits     POCT CBC        Component Value Flag Ref Range Units Status    WBC IC 10.7      4.0 - 11.0 x10ˆ3/uL Final    RBC IC 5.86      4.30 - 5.70 X10ˆ6/uL Final    HGB IC 16.7      13.0 - 17.5 g/dL Final    HCT IC 50.1      39.0 - 53.0 % Final    MCV IC 85.5      80.0 - 100.0 fL Final    MCH IC 28.5      26.0 - 34.0 pg Final    MCHC IC 33.3      31.0 - 37.0 g/dL Final    PLT .0      150.0 - 450.0 X10ˆ3/uL Final    # Neutrophil 6.7      1.5 - 7.7 X10ˆ3/uL Final    # Lymphocyte 2.8      1.0 - 4.0 X10ˆ3/uL Final    # Mixed Cells 1.2      0.1 - 1.0 X10ˆ3/uL Final    Neutrophil % 63.4       % Final    Lymphocyte % 25.8       % Final    Mixed Cell % 10.8       % Final                  CT ABDOMEN+PELVIS(CONTRAST ONLY)(CPT=74177)          PROCEDURE: CT ABDOMEN + PELVIS (CONTRAST ONLY) (CPT=74177)     COMPARISON: [facility] [place] [facility],  ABDOMEN LIMITED (CPT=76705), 11/09/2023, 8:28 AM.     INDICATIONS: Abdominal pain.     TECHNIQUE: CT images of the abdomen and pelvis were obtained with non-ionic intravenous contrast material.  Automated exposure control for dose reduction was used. Adjustment of the mA and/or kV was done based on the patient's size. Use of iterative   reconstruction technique for dose reduction was used.  Dose  information is transmitted to the ACR (American College of Radiology) NRDR (National Radiology Data Registry) which includes the Dose Index Registry.     FINDINGS:   LOWER CHEST: Minimal bibasilar atelectasis.  The heart is within normal limits of size.  No visualized coronary artery calcifications.  There is bilateral gynecomastia.     HEPATOBILIARY:   The liver is mildly enlarged.  There is hepatic steatosis.  No suspicious hepatic lesion.  No acute cholecystitis.  No biliary ductal dilatation.     SPLEEN:   No enlargement or focal lesion.       PANCREAS:   No lesion, fluid collection, ductal dilatation, or atrophy.       ADRENALS:   No mass or enlargement.       GENITOURINARY:   No hydronephrosis or urinary calculus.  No enhancing renal mass.  The bladder is unremarkable.     GI TRACT:    No bowel obstruction.  No abnormal bowel wall thickening.  The appendix is unremarkable.  No acute appendicitis.  There is colonic diverticulosis without evidence of acute diverticulitis.  There are scattered fluid-filled loops of small   intestine and right hemicolon.     PELVIC ORGANS: The prostate is within normal limits of size.     AORTA/VASCULAR:   No aneurysm or dissection.      RETROPERITONEUM:   No mass or enlarged adenopathy.       PERITONEUM: No pneumoperitoneum or ascites.     LYMPH NODES: There is an 11 mm periportal lymph node (2:57).  There is a 12 mm gastrohepatic ligament lymph node (2:43).  There is a 10 mm peripancreatic lymph node (2:51).  There are multiple prominent but nonenlarged right lower quadrant lymph nodes   measuring up to 9 mm in short axis (2:107).  There are scattered nonenlarged mesenteric lymph nodes.     BONES:  No acute fracture. No aggressive osseous lesion.  Multilevel degenerative changes of the lower thoracic and lumbar spine.     OTHER:   Negative.                   =====  CONCLUSION:      1. Fluid-filled loops of small intestine and right hemicolon, which are nonspecific but can be  seen in the setting of a diarrheal illness/enterocolitis.  2. No bowel obstruction, acute appendicitis, abnormal bowel wall thickening, or acute diverticulitis.    3. No hydronephrosis or urinary calculus.  4. Hepatomegaly and hepatic steatosis.    5. Mild upper abdominal lymphadenopathy, which is favored to be reactive.    6. Lesser incidental findings described above.             Dictated by (CST): Thomas Luevano MD on 4/02/2025 at 2:22 PM       Finalized by (CST): Thomas Luevano MD on 4/02/2025 at 2:27 PM                 POCT ISTAT chem8 cartridge        Component Value Flag Ref Range Units Status    ISTAT Sodium 140      136 - 145 mmol/L Final    ISTAT BUN 15      7 - 18 mg/dL Final    ISTAT Potassium 3.9      3.6 - 5.1 mmol/L Final    ISTAT Chloride 103      98 - 112 mmol/L Final    ISTAT Ionized Calcium 1.23      1.12 - 1.32 mmol/L Final    ISTAT Hematocrit 54      37 - 53 % Final    ISTAT Glucose 89      70 - 99 mg/dL Final    ISTAT TCO2 24      21 - 32 mmol/L Final    ISTAT Creatinine 1.20      0.70 - 1.30 mg/dL Final    Comment:    This test may exhibit falsely elevated results when a sample is collected from a patient who is presently taking Hydroxyurea. If patient is consuming Hydroxyurea, i-STAT creatinine analysis should be considered inaccurate and a sample should be referred to the Central Lab for analysis, if clinically indicated.    eGFR-Cr 76      >=60 mL/min/1.73m2 Final                                MDM              Medical Decision Making  44-year-old male presents with abdominal bloating and diarrhea.  Differential diagnosis includes gastroenteritis, infectious diarrhea.  Medication side effect.  Patient states he is currently taking Zepbound and recently had a dose increase.  His symptoms could be related to this medication.  He denies recent travel or antibiotic use.  Infectious diarrhea is not likely.  POC CBC is within normal limits.  POC chemistry is within normal limits.  [place] CT was  done and shows no specific cause of his abdominal pain.  There are findings consistent with a diarrheal illness.  These findings were discussed with patient.  He was instructed he may use an over-the-counter antidiarrheal.  He was also encouraged to follow-up with his primary care doctor regarding his use of Zepbound.  He was also given instructions when to go to the emergency room.    Amount and/or Complexity of Data Reviewed  Labs: ordered.     Details: POC CBC is WNL  POC Chem is WNL  Radiology: ordered.     Details: Abd CT images reviewed by IC provider.  Shows no specific cause of abdominal pain.  There are some findings consistent with a diarrheal illness    Risk  OTC drugs.        Disposition and Plan     Clinical Impression:  1. Diarrhea, unspecified type         Disposition:  Discharge  4/2/2025  2:59 pm    Follow-up:  Mary Barry MD  [address] [place] [address] [phone]      If symptoms worsen          Medications Prescribed:  Discharge Medication List as of 4/2/2025  3:00 PM              Supplementary Documentation:

## 2025-05-02 ENCOUNTER — OFFICE VISIT (OUTPATIENT)
Dept: INTERNAL MEDICINE CLINIC | Facility: CLINIC | Age: 45
End: 2025-05-02

## 2025-05-02 ENCOUNTER — TELEPHONE (OUTPATIENT)
Dept: INTERNAL MEDICINE CLINIC | Facility: CLINIC | Age: 45
End: 2025-05-02

## 2025-05-02 ENCOUNTER — LAB ENCOUNTER (OUTPATIENT)
Dept: LAB | Age: 45
End: 2025-05-02
Attending: INTERNAL MEDICINE
Payer: COMMERCIAL

## 2025-05-02 VITALS
BODY MASS INDEX: 34.24 KG/M2 | WEIGHT: 293 LBS | SYSTOLIC BLOOD PRESSURE: 134 MMHG | DIASTOLIC BLOOD PRESSURE: 82 MMHG | HEIGHT: 77.5 IN | HEART RATE: 81 BPM

## 2025-05-02 DIAGNOSIS — N52.9 ERECTILE DISORDER: ICD-10-CM

## 2025-05-02 DIAGNOSIS — Z12.11 COLON CANCER SCREENING: ICD-10-CM

## 2025-05-02 DIAGNOSIS — Z00.00 PHYSICAL EXAM, ROUTINE: ICD-10-CM

## 2025-05-02 DIAGNOSIS — E83.118 OTHER HEMOCHROMATOSIS: ICD-10-CM

## 2025-05-02 DIAGNOSIS — Z00.00 PHYSICAL EXAM, ANNUAL: ICD-10-CM

## 2025-05-02 DIAGNOSIS — Z00.00 PHYSICAL EXAM, ANNUAL: Primary | ICD-10-CM

## 2025-05-02 DIAGNOSIS — E83.118 OTHER HEMOCHROMATOSIS: Primary | ICD-10-CM

## 2025-05-02 LAB
ALBUMIN SERPL-MCNC: 5 G/DL (ref 3.2–4.8)
ALBUMIN/GLOB SERPL: 1.9 {RATIO} (ref 1–2)
ALP LIVER SERPL-CCNC: 88 U/L (ref 45–117)
ALT SERPL-CCNC: 66 U/L (ref 10–49)
ANION GAP SERPL CALC-SCNC: 10 MMOL/L (ref 0–18)
AST SERPL-CCNC: 43 U/L (ref ?–34)
BASOPHILS # BLD AUTO: 0.05 X10(3) UL (ref 0–0.2)
BASOPHILS NFR BLD AUTO: 0.5 %
BILIRUB SERPL-MCNC: 0.5 MG/DL (ref 0.3–1.2)
BUN BLD-MCNC: 12 MG/DL (ref 9–23)
BUN/CREAT SERPL: 11.4 (ref 10–20)
CALCIUM BLD-MCNC: 9.7 MG/DL (ref 8.7–10.4)
CHLORIDE SERPL-SCNC: 104 MMOL/L (ref 98–112)
CHOLEST SERPL-MCNC: 245 MG/DL (ref ?–200)
CO2 SERPL-SCNC: 28 MMOL/L (ref 21–32)
CREAT BLD-MCNC: 1.05 MG/DL (ref 0.7–1.3)
DEPRECATED HBV CORE AB SER IA-ACNC: 130 NG/ML (ref 50–336)
DEPRECATED RDW RBC AUTO: 39.9 FL (ref 35.1–46.3)
EGFRCR SERPLBLD CKD-EPI 2021: 89 ML/MIN/1.73M2 (ref 60–?)
EOSINOPHIL # BLD AUTO: 0.54 X10(3) UL (ref 0–0.7)
EOSINOPHIL NFR BLD AUTO: 5 %
ERYTHROCYTE [DISTWIDTH] IN BLOOD BY AUTOMATED COUNT: 12.6 % (ref 11–15)
EST. AVERAGE GLUCOSE BLD GHB EST-MCNC: 108 MG/DL (ref 68–126)
FASTING PATIENT LIPID ANSWER: NO
FASTING STATUS PATIENT QL REPORTED: NO
GLOBULIN PLAS-MCNC: 2.7 G/DL (ref 2–3.5)
GLUCOSE BLD-MCNC: 84 MG/DL (ref 70–99)
HBA1C MFR BLD: 5.4 % (ref ?–5.7)
HCT VFR BLD AUTO: 49.9 % (ref 39–53)
HDLC SERPL-MCNC: 39 MG/DL (ref 40–59)
HGB BLD-MCNC: 16.7 G/DL (ref 13–17.5)
IMM GRANULOCYTES # BLD AUTO: 0.02 X10(3) UL (ref 0–1)
IMM GRANULOCYTES NFR BLD: 0.2 %
LDLC SERPL CALC-MCNC: 167 MG/DL (ref ?–100)
LYMPHOCYTES # BLD AUTO: 3.61 X10(3) UL (ref 1–4)
LYMPHOCYTES NFR BLD AUTO: 33.1 %
MCH RBC QN AUTO: 29.1 PG (ref 26–34)
MCHC RBC AUTO-ENTMCNC: 33.5 G/DL (ref 31–37)
MCV RBC AUTO: 86.9 FL (ref 80–100)
MONOCYTES # BLD AUTO: 0.82 X10(3) UL (ref 0.1–1)
MONOCYTES NFR BLD AUTO: 7.5 %
NEUTROPHILS # BLD AUTO: 5.85 X10 (3) UL (ref 1.5–7.7)
NEUTROPHILS # BLD AUTO: 5.85 X10(3) UL (ref 1.5–7.7)
NEUTROPHILS NFR BLD AUTO: 53.7 %
NONHDLC SERPL-MCNC: 206 MG/DL (ref ?–130)
OSMOLALITY SERPL CALC.SUM OF ELEC: 293 MOSM/KG (ref 275–295)
PLATELET # BLD AUTO: 251 10(3)UL (ref 150–450)
POTASSIUM SERPL-SCNC: 4.4 MMOL/L (ref 3.5–5.1)
PROT SERPL-MCNC: 7.7 G/DL (ref 5.7–8.2)
RBC # BLD AUTO: 5.74 X10(6)UL (ref 4.3–5.7)
SODIUM SERPL-SCNC: 142 MMOL/L (ref 136–145)
TRIGL SERPL-MCNC: 210 MG/DL (ref 30–149)
TSI SER-ACNC: 0.92 UIU/ML (ref 0.55–4.78)
VLDLC SERPL CALC-MCNC: 42 MG/DL (ref 0–30)
WBC # BLD AUTO: 10.9 X10(3) UL (ref 4–11)

## 2025-05-02 PROCEDURE — 80061 LIPID PANEL: CPT

## 2025-05-02 PROCEDURE — 84402 ASSAY OF FREE TESTOSTERONE: CPT

## 2025-05-02 PROCEDURE — 99396 PREV VISIT EST AGE 40-64: CPT | Performed by: INTERNAL MEDICINE

## 2025-05-02 PROCEDURE — 3008F BODY MASS INDEX DOCD: CPT | Performed by: INTERNAL MEDICINE

## 2025-05-02 PROCEDURE — 36415 COLL VENOUS BLD VENIPUNCTURE: CPT

## 2025-05-02 PROCEDURE — 80053 COMPREHEN METABOLIC PANEL: CPT

## 2025-05-02 PROCEDURE — 3075F SYST BP GE 130 - 139MM HG: CPT | Performed by: INTERNAL MEDICINE

## 2025-05-02 PROCEDURE — 82728 ASSAY OF FERRITIN: CPT

## 2025-05-02 PROCEDURE — 84403 ASSAY OF TOTAL TESTOSTERONE: CPT

## 2025-05-02 PROCEDURE — 3079F DIAST BP 80-89 MM HG: CPT | Performed by: INTERNAL MEDICINE

## 2025-05-02 PROCEDURE — 84443 ASSAY THYROID STIM HORMONE: CPT

## 2025-05-02 PROCEDURE — 83036 HEMOGLOBIN GLYCOSYLATED A1C: CPT

## 2025-05-02 PROCEDURE — 85025 COMPLETE CBC W/AUTO DIFF WBC: CPT

## 2025-05-02 RX ORDER — SILDENAFIL 100 MG/1
100 TABLET, FILM COATED ORAL AS NEEDED
Qty: 10 TABLET | Refills: 2 | Status: SHIPPED | OUTPATIENT
Start: 2025-05-02

## 2025-05-02 RX ORDER — BUPROPION HYDROCHLORIDE 150 MG/1
150 TABLET ORAL EVERY MORNING
COMMUNITY
Start: 2025-04-09

## 2025-05-02 RX ORDER — DULOXETIN HYDROCHLORIDE 60 MG/1
60 CAPSULE, DELAYED RELEASE ORAL DAILY
COMMUNITY
Start: 2025-04-12

## 2025-05-03 ENCOUNTER — TELEPHONE (OUTPATIENT)
Dept: INTERNAL MEDICINE CLINIC | Facility: CLINIC | Age: 45
End: 2025-05-03

## 2025-05-03 NOTE — PROGRESS NOTES
Subjective:     Patient ID: Ramon Santos is a 44 year old male.  Presents for physical exam    HPI  Patient reports that he has been feeling well overall, seeing psychiatrist and current medication controlling anxiety and depression.  He has history of hemochromatosis used to get phlebotomies but has not done 1 for a while, but goes to donate blood.  He discontinued Eliquis on his  own, he has history of pulmonary emboli.  He has been taking compounded versions of semaglutide on and off medication help him to lose some weight.  He needs a refill on Viagra, medication is more effective than Cialis  He reports that he is doing well current medication for ADHD and anxiety  Review of Systems       Constitutional:  Negative for decreased activity, fever, irritability and lethargy  Cardiovascular:  Negative for chest pain and irregular heartbeat/palpitations  Respiratory:  Negative for cough, dyspnea and wheezing.  Eyes:  Negative for eye discharge and vision loss  Endocrine:  Negative for polydipsia and polyphagia  Integumentary:  Negative for pruritus and rash  Neurological:  Negative for gait disturbance, paresthesias.   Psychiatric:  Negative for inappropriate interaction and psychiatric symptoms  Current Medications[1]  Allergies:Allergies[2]    Past Medical History[3]   Past Surgical History[4]   Family History[5]   Social History: Short Social Hx on File[6]     /82 (BP Location: Left arm, Patient Position: Sitting, Cuff Size: adult)   Pulse 81   Ht 6' 5.5\" (1.969 m)   Wt 293 lb (132.9 kg)   BMI 34.30 kg/m²    Physical Exam  Constitutional:       Appearance: Normal appearance.   HENT:      Head: Normocephalic and atraumatic.      Right Ear: Tympanic membrane, ear canal and external ear normal.      Left Ear: Tympanic membrane, ear canal and external ear normal.   Eyes:      General: No scleral icterus.     Extraocular Movements: Extraocular movements intact.      Conjunctiva/sclera: Conjunctivae normal.       Pupils: Pupils are equal, round, and reactive to light.   Neck:      Vascular: No carotid bruit.   Cardiovascular:      Rate and Rhythm: Normal rate and regular rhythm.      Heart sounds: No murmur heard.     No gallop.   Pulmonary:      Effort: Pulmonary effort is normal. No respiratory distress.      Breath sounds: No wheezing or rhonchi.   Abdominal:      General: Bowel sounds are normal.      Palpations: Abdomen is soft. There is no mass.      Tenderness: There is no abdominal tenderness. There is no guarding or rebound.   Musculoskeletal:         General: Normal range of motion.      Cervical back: Normal range of motion and neck supple.      Right lower leg: No edema.      Left lower leg: No edema.   Lymphadenopathy:      Cervical: No cervical adenopathy.   Skin:     General: Skin is warm.      Coloration: Skin is not jaundiced.   Neurological:      General: No focal deficit present.      Mental Status: He is alert and oriented to person, place, and time. Mental status is at baseline.   Psychiatric:         Mood and Affect: Mood normal.         Behavior: Behavior normal.         Thought Content: Thought content normal.         Assessment & Plan:   1. Colon cancer screening referred patient to see gastroenterology refill   2. Physical exam, annual continue healthy diet regular physical activity is encouraged, will check labs   3. Erectile disorder Viagra, check testosterone level   4. Other hemochromatosis advised patient to reestablish with hematology referral provided       Orders Placed This Encounter   Procedures    CBC With Differential With Platelet    Comp Metabolic Panel (14)    Lipid Panel    TSH W Reflex To Free T4    Testosterone, Total And Free [E]    Ferritin [E]       Meds This Visit:  Requested Prescriptions      No prescriptions requested or ordered in this encounter       Imaging & Referrals:  GASTRO - INTERNAL  OP REFERRAL TO HEMATOLOGY/ONCOLOGY GROUP     Follow-up in 6 months       [1]    Current Outpatient Medications   Medication Sig Dispense Refill    Sildenafil Citrate (VIAGRA) 100 MG Oral Tab Take 1 tablet (100 mg total) by mouth as needed for Erectile Dysfunction. 10 tablet 2    DULoxetine 60 MG Oral Cap DR Particles Take 1 capsule (60 mg total) by mouth daily.      buPROPion  MG Oral Tablet 24 Hr Take 1 tablet (150 mg total) by mouth every morning.      Tirzepatide 10 MG/0.5ML Subcutaneous Solution Auto-injector Inject into the skin.      Tadalafil 20 MG Oral Tab Take 1 tablet (20 mg total) by mouth daily as needed for Erectile Dysfunction. 8 tablet 1    DULoxetine 30 MG Oral Cap DR Particles Take 1 capsule (30 mg total) by mouth daily.      amphetamine-dextroamphetamine (ADDERALL) 30 MG Oral Tab Take 1 tablet (30 mg total) by mouth 2 (two) times daily. 60 tablet 0    apixaban (ELIQUIS) 5 MG Oral Tab Take 1 tablet (5 mg total) by mouth 2 (two) times daily. (Patient not taking: Reported on 5/2/2025) 60 tablet 5   [2]   Allergies  Allergen Reactions    Ceclor [Cefaclor] RASH   [3]   Past Medical History:   ADD (attention deficit disorder)    Hyperlipidemia    Pulmonary emboli (HCC)   [4]   Past Surgical History:  Procedure Laterality Date    Other      TIP/SEPTUM/OSTEOTOMIES    Other surgical history  2006    R tib fib fx   [place]     Other surgical history  2002    shoulder separation  - 4th degree L    Tonsillectomy     [5]   Family History  Problem Relation Age of Onset    Other (Other) Father         Overweight  -  /deep venous thrombopghlebitis     Cancer Mother         Breast cancer  10 yr survivor     Other (Other) Maternal Grandmother         Parkinsons  slowly progression  -      Diabetes Maternal Grandfather         severe  -      Other (Other) Paternal Grandmother         Ruptured aorta abdominal   [6]   Social History  Socioeconomic History    Marital status:    Tobacco Use    Smoking status: Former     Passive exposure: Never    Smokeless tobacco: Former    Tobacco  comments:     3/4 tin / day  -     Vaping Use    Vaping status: Never Used   Substance and Sexual Activity    Alcohol use: Yes     Comment: occ    Drug use: No   Other Topics Concern    Reaction to local anesthetic No

## 2025-05-07 ENCOUNTER — TELEPHONE (OUTPATIENT)
Dept: INTERNAL MEDICINE CLINIC | Facility: CLINIC | Age: 45
End: 2025-05-07

## 2025-05-07 LAB
FREE TESTOST DIRECT: 4.8 PG/ML
TESTOSTERONE: 274 NG/DL